# Patient Record
Sex: FEMALE | Race: WHITE | Employment: UNEMPLOYED | URBAN - METROPOLITAN AREA
[De-identification: names, ages, dates, MRNs, and addresses within clinical notes are randomized per-mention and may not be internally consistent; named-entity substitution may affect disease eponyms.]

---

## 2023-12-04 ENCOUNTER — OFFICE VISIT (OUTPATIENT)
Dept: FAMILY MEDICINE CLINIC | Facility: CLINIC | Age: 38
End: 2023-12-04
Payer: MEDICAID

## 2023-12-04 VITALS
DIASTOLIC BLOOD PRESSURE: 84 MMHG | BODY MASS INDEX: 45.67 KG/M2 | RESPIRATION RATE: 16 BRPM | HEIGHT: 63.25 IN | SYSTOLIC BLOOD PRESSURE: 136 MMHG | HEART RATE: 105 BPM | WEIGHT: 261 LBS | OXYGEN SATURATION: 98 %

## 2023-12-04 DIAGNOSIS — E78.2 MIXED HYPERLIPIDEMIA: ICD-10-CM

## 2023-12-04 DIAGNOSIS — E11.9 TYPE 2 DIABETES MELLITUS WITHOUT COMPLICATION, WITHOUT LONG-TERM CURRENT USE OF INSULIN (HCC): ICD-10-CM

## 2023-12-04 DIAGNOSIS — Z00.00 LABORATORY EXAMINATION ORDERED AS PART OF A ROUTINE GENERAL MEDICAL EXAMINATION: ICD-10-CM

## 2023-12-04 DIAGNOSIS — Z82.49 FAMILY HISTORY OF HEART DISEASE: ICD-10-CM

## 2023-12-04 DIAGNOSIS — K76.0 HEPATIC STEATOSIS: ICD-10-CM

## 2023-12-04 DIAGNOSIS — Z12.31 ENCOUNTER FOR SCREENING MAMMOGRAM FOR BREAST CANCER: ICD-10-CM

## 2023-12-04 DIAGNOSIS — G43.109 MIGRAINE WITH AURA AND WITHOUT STATUS MIGRAINOSUS, NOT INTRACTABLE: ICD-10-CM

## 2023-12-04 DIAGNOSIS — K42.9 UMBILICAL HERNIA WITHOUT OBSTRUCTION AND WITHOUT GANGRENE: Primary | ICD-10-CM

## 2023-12-04 DIAGNOSIS — Z23 NEED FOR VACCINATION: ICD-10-CM

## 2023-12-04 RX ORDER — ROSUVASTATIN CALCIUM 10 MG/1
10 TABLET, COATED ORAL NIGHTLY
Qty: 30 TABLET | Refills: 2 | Status: SHIPPED | OUTPATIENT
Start: 2023-12-04

## 2023-12-04 RX ORDER — BLOOD SUGAR DIAGNOSTIC
STRIP MISCELLANEOUS
COMMUNITY
Start: 2023-02-24

## 2023-12-04 RX ORDER — METFORMIN HYDROCHLORIDE 500 MG/1
1000 TABLET, EXTENDED RELEASE ORAL DAILY
COMMUNITY
Start: 2023-05-24

## 2023-12-04 RX ORDER — IBUPROFEN 600 MG/1
600 TABLET ORAL EVERY 8 HOURS PRN
COMMUNITY
Start: 2023-04-13

## 2023-12-04 RX ORDER — CITALOPRAM 40 MG/1
40 TABLET ORAL DAILY
COMMUNITY
Start: 2022-11-15

## 2023-12-04 RX ORDER — HYDROXYZINE HYDROCHLORIDE 25 MG/1
25 TABLET, FILM COATED ORAL 3 TIMES DAILY PRN
COMMUNITY

## 2023-12-04 RX ORDER — ATORVASTATIN CALCIUM 40 MG/1
40 TABLET, FILM COATED ORAL NIGHTLY
COMMUNITY
Start: 2023-05-23 | End: 2023-12-04

## 2023-12-04 RX ORDER — ALBUTEROL SULFATE 90 UG/1
2 AEROSOL, METERED RESPIRATORY (INHALATION) EVERY 4 HOURS PRN
COMMUNITY
Start: 2023-02-24

## 2023-12-04 RX ORDER — LANCETS
1 EACH MISCELLANEOUS AS NEEDED
COMMUNITY
Start: 2022-12-03

## 2023-12-04 RX ORDER — GALCANEZUMAB 120 MG/ML
240 INJECTION, SOLUTION SUBCUTANEOUS
Qty: 2 ML | Refills: 0 | Status: SHIPPED | OUTPATIENT
Start: 2023-12-04

## 2023-12-04 RX ORDER — BLOOD-GLUCOSE METER
1 EACH MISCELLANEOUS AS NEEDED
COMMUNITY
Start: 2022-12-03

## 2023-12-04 RX ORDER — GALCANEZUMAB 120 MG/ML
120 INJECTION, SOLUTION SUBCUTANEOUS
COMMUNITY
Start: 2023-01-16 | End: 2023-12-04

## 2023-12-05 ENCOUNTER — TELEPHONE (OUTPATIENT)
Dept: FAMILY MEDICINE CLINIC | Facility: CLINIC | Age: 38
End: 2023-12-05

## 2023-12-05 ENCOUNTER — LAB ENCOUNTER (OUTPATIENT)
Dept: LAB | Age: 38
End: 2023-12-05
Attending: FAMILY MEDICINE
Payer: MEDICAID

## 2023-12-05 DIAGNOSIS — Z00.00 LABORATORY EXAMINATION ORDERED AS PART OF A ROUTINE GENERAL MEDICAL EXAMINATION: ICD-10-CM

## 2023-12-05 DIAGNOSIS — E11.9 TYPE 2 DIABETES MELLITUS WITHOUT COMPLICATION, WITHOUT LONG-TERM CURRENT USE OF INSULIN (HCC): ICD-10-CM

## 2023-12-05 LAB
ALBUMIN SERPL-MCNC: 3.6 G/DL (ref 3.4–5)
ALBUMIN/GLOB SERPL: 0.9 {RATIO} (ref 1–2)
ALP LIVER SERPL-CCNC: 77 U/L
ALT SERPL-CCNC: 76 U/L
ANION GAP SERPL CALC-SCNC: 8 MMOL/L (ref 0–18)
AST SERPL-CCNC: 53 U/L (ref 15–37)
BASOPHILS # BLD AUTO: 0.07 X10(3) UL (ref 0–0.2)
BASOPHILS NFR BLD AUTO: 0.8 %
BILIRUB SERPL-MCNC: 0.6 MG/DL (ref 0.1–2)
BUN BLD-MCNC: 14 MG/DL (ref 9–23)
CALCIUM BLD-MCNC: 9.3 MG/DL (ref 8.5–10.1)
CHLORIDE SERPL-SCNC: 103 MMOL/L (ref 98–112)
CHOLEST SERPL-MCNC: 275 MG/DL (ref ?–200)
CO2 SERPL-SCNC: 28 MMOL/L (ref 21–32)
CREAT BLD-MCNC: 0.69 MG/DL
CREAT UR-SCNC: 172 MG/DL
EGFRCR SERPLBLD CKD-EPI 2021: 112 ML/MIN/1.73M2 (ref 60–?)
EOSINOPHIL # BLD AUTO: 0.68 X10(3) UL (ref 0–0.7)
EOSINOPHIL NFR BLD AUTO: 8.2 %
ERYTHROCYTE [DISTWIDTH] IN BLOOD BY AUTOMATED COUNT: 12.5 %
FASTING PATIENT LIPID ANSWER: YES
FASTING STATUS PATIENT QL REPORTED: YES
GLOBULIN PLAS-MCNC: 4.2 G/DL (ref 2.8–4.4)
GLUCOSE BLD-MCNC: 122 MG/DL (ref 70–99)
HCT VFR BLD AUTO: 44.6 %
HDLC SERPL-MCNC: 62 MG/DL (ref 40–59)
HGB BLD-MCNC: 14.5 G/DL
IMM GRANULOCYTES # BLD AUTO: 0.02 X10(3) UL (ref 0–1)
IMM GRANULOCYTES NFR BLD: 0.2 %
LDLC SERPL CALC-MCNC: 189 MG/DL (ref ?–100)
LYMPHOCYTES # BLD AUTO: 2.39 X10(3) UL (ref 1–4)
LYMPHOCYTES NFR BLD AUTO: 28.8 %
MCH RBC QN AUTO: 27.7 PG (ref 26–34)
MCHC RBC AUTO-ENTMCNC: 32.5 G/DL (ref 31–37)
MCV RBC AUTO: 85.1 FL
MICROALBUMIN UR-MCNC: 1.42 MG/DL
MICROALBUMIN/CREAT 24H UR-RTO: 8.3 UG/MG (ref ?–30)
MONOCYTES # BLD AUTO: 0.74 X10(3) UL (ref 0.1–1)
MONOCYTES NFR BLD AUTO: 8.9 %
NEUTROPHILS # BLD AUTO: 4.4 X10 (3) UL (ref 1.5–7.7)
NEUTROPHILS # BLD AUTO: 4.4 X10(3) UL (ref 1.5–7.7)
NEUTROPHILS NFR BLD AUTO: 53.1 %
NONHDLC SERPL-MCNC: 213 MG/DL (ref ?–130)
OSMOLALITY SERPL CALC.SUM OF ELEC: 290 MOSM/KG (ref 275–295)
PLATELET # BLD AUTO: 180 10(3)UL (ref 150–450)
POTASSIUM SERPL-SCNC: 4.7 MMOL/L (ref 3.5–5.1)
PROT SERPL-MCNC: 7.8 G/DL (ref 6.4–8.2)
RBC # BLD AUTO: 5.24 X10(6)UL
SODIUM SERPL-SCNC: 139 MMOL/L (ref 136–145)
TRIGL SERPL-MCNC: 136 MG/DL (ref 30–149)
TSI SER-ACNC: 2.77 MIU/ML (ref 0.36–3.74)
VLDLC SERPL CALC-MCNC: 28 MG/DL (ref 0–30)
WBC # BLD AUTO: 8.3 X10(3) UL (ref 4–11)

## 2023-12-05 PROCEDURE — 84443 ASSAY THYROID STIM HORMONE: CPT

## 2023-12-05 PROCEDURE — 83036 HEMOGLOBIN GLYCOSYLATED A1C: CPT

## 2023-12-05 PROCEDURE — 85025 COMPLETE CBC W/AUTO DIFF WBC: CPT

## 2023-12-05 PROCEDURE — 82043 UR ALBUMIN QUANTITATIVE: CPT

## 2023-12-05 PROCEDURE — 80053 COMPREHEN METABOLIC PANEL: CPT

## 2023-12-05 PROCEDURE — 80061 LIPID PANEL: CPT

## 2023-12-05 PROCEDURE — 82570 ASSAY OF URINE CREATININE: CPT

## 2023-12-05 PROCEDURE — 36415 COLL VENOUS BLD VENIPUNCTURE: CPT

## 2023-12-07 LAB — HGBA1C: 7.1 %

## 2023-12-11 ENCOUNTER — OFFICE VISIT (OUTPATIENT)
Facility: LOCATION | Age: 38
End: 2023-12-11
Payer: MEDICAID

## 2023-12-11 VITALS — HEART RATE: 100 BPM | TEMPERATURE: 97 F

## 2023-12-11 DIAGNOSIS — K42.9 UMBILICAL HERNIA WITHOUT OBSTRUCTION AND WITHOUT GANGRENE: Primary | ICD-10-CM

## 2023-12-13 DIAGNOSIS — R79.89 ELEVATED LFTS: Primary | ICD-10-CM

## 2023-12-13 DIAGNOSIS — E78.00 ELEVATED LDL CHOLESTEROL LEVEL: ICD-10-CM

## 2023-12-13 DIAGNOSIS — E11.9 TYPE 2 DIABETES MELLITUS WITHOUT COMPLICATION, WITHOUT LONG-TERM CURRENT USE OF INSULIN (HCC): ICD-10-CM

## 2023-12-28 ENCOUNTER — TELEPHONE (OUTPATIENT)
Dept: FAMILY MEDICINE CLINIC | Facility: CLINIC | Age: 38
End: 2023-12-28

## 2023-12-28 NOTE — TELEPHONE ENCOUNTER
Surgery 1/24/24  Surgeon Adilene Multani MD   Location [place] [address] sheet+ pre-op orders routed to provider.

## 2023-12-30 ENCOUNTER — HOSPITAL ENCOUNTER (OUTPATIENT)
Dept: CT IMAGING | Age: 38
Discharge: HOME | End: 2023-12-30
Attending: FAMILY MEDICINE

## 2023-12-30 DIAGNOSIS — Z82.49 FAMILY HISTORY OF HEART DISEASE: ICD-10-CM

## 2024-01-02 ENCOUNTER — HOSPITAL ENCOUNTER (OUTPATIENT)
Dept: MAMMOGRAPHY | Age: 39
Discharge: HOME | End: 2024-01-02
Attending: FAMILY MEDICINE
Payer: MEDICAID

## 2024-01-02 DIAGNOSIS — Z12.31 ENCOUNTER FOR SCREENING MAMMOGRAM FOR BREAST CANCER: ICD-10-CM

## 2024-01-02 PROCEDURE — 77063 BREAST TOMOSYNTHESIS BI: CPT | Performed by: FAMILY MEDICINE

## 2024-01-02 PROCEDURE — 77067 SCR MAMMO BI INCL CAD: CPT | Performed by: FAMILY MEDICINE

## 2024-01-03 ENCOUNTER — TELEPHONE (OUTPATIENT)
Facility: LOCATION | Age: 39
End: 2024-01-03

## 2024-01-03 ENCOUNTER — OFFICE VISIT (OUTPATIENT)
Dept: FAMILY MEDICINE CLINIC | Facility: CLINIC | Age: 39
End: 2024-01-03
Payer: MEDICAID

## 2024-01-03 ENCOUNTER — TELEPHONE (OUTPATIENT)
Dept: FAMILY MEDICINE CLINIC | Facility: CLINIC | Age: 39
End: 2024-01-03

## 2024-01-03 VITALS
HEART RATE: 85 BPM | SYSTOLIC BLOOD PRESSURE: 136 MMHG | OXYGEN SATURATION: 96 % | RESPIRATION RATE: 16 BRPM | WEIGHT: 264 LBS | DIASTOLIC BLOOD PRESSURE: 84 MMHG | HEIGHT: 63 IN | BODY MASS INDEX: 46.78 KG/M2

## 2024-01-03 DIAGNOSIS — Z01.818 PRE-OP TESTING: ICD-10-CM

## 2024-01-03 DIAGNOSIS — G43.109 MIGRAINE WITH AURA AND WITHOUT STATUS MIGRAINOSUS, NOT INTRACTABLE: ICD-10-CM

## 2024-01-03 DIAGNOSIS — K42.9 UMBILICAL HERNIA WITHOUT OBSTRUCTION AND WITHOUT GANGRENE: Primary | ICD-10-CM

## 2024-01-03 DIAGNOSIS — E11.9 TYPE 2 DIABETES MELLITUS WITHOUT COMPLICATION, WITHOUT LONG-TERM CURRENT USE OF INSULIN (HCC): ICD-10-CM

## 2024-01-03 DIAGNOSIS — E78.2 MIXED HYPERLIPIDEMIA: ICD-10-CM

## 2024-01-03 DIAGNOSIS — F33.42 RECURRENT MAJOR DEPRESSIVE DISORDER, IN FULL REMISSION (HCC): ICD-10-CM

## 2024-01-03 PROCEDURE — 93000 ELECTROCARDIOGRAM COMPLETE: CPT | Performed by: FAMILY MEDICINE

## 2024-01-03 PROCEDURE — 3008F BODY MASS INDEX DOCD: CPT | Performed by: FAMILY MEDICINE

## 2024-01-03 PROCEDURE — 3079F DIAST BP 80-89 MM HG: CPT | Performed by: FAMILY MEDICINE

## 2024-01-03 PROCEDURE — 3075F SYST BP GE 130 - 139MM HG: CPT | Performed by: FAMILY MEDICINE

## 2024-01-03 PROCEDURE — 99214 OFFICE O/P EST MOD 30 MIN: CPT | Performed by: FAMILY MEDICINE

## 2024-01-03 RX ORDER — CITALOPRAM 40 MG/1
40 TABLET ORAL DAILY
Qty: 90 TABLET | Refills: 1 | Status: SHIPPED | OUTPATIENT
Start: 2024-01-03

## 2024-01-03 NOTE — PROGRESS NOTES
[facility] Family Medicine Office Note  Chief Complaint:   Chief Complaint   Patient presents with    Follow - Up       HPI:   This is a 38 year old female coming in for follow up/preop.     Umbilical hernia - robotic laparoscopic ventral hernia repair with mesh scheduled for . Has had anesthesia in the past, no complications. Denies any new sx regarding her hernia.     T2DM - showed acceptable control with A1c at 7.1%. Has been compliant with metformin.     Depression -s table on citalopram    Migraines - has not followed up with neurology yet. Emgality not approved by insurance.     Hyperlipidemia/FLD - tolerating crestor well w/o side effects. Heart scan was normal    Past Medical History:   Diagnosis Date    Anxiety 5yrs    Arthritis     left hand due to a break 20 yrs ago    Depression 15 yrs    Diabetes (HCC) 20    Disorder of liver     FATTY LIVER    Hyperlipidemia 5 yrs    fatty liver also    Migraines     Obesity always     Past Surgical History:   Procedure Laterality Date      2009    HYSTERECTOMY  2022    ovaries remain, everything else removed     Social History:  Social History     Socioeconomic History    Marital status: Single   Tobacco Use    Smoking status: Former     Packs/day: 1.00     Years: 19.00     Additional pack years: 0.00     Total pack years: 19.00     Types: Cigarettes     Quit date: 2019     Years since quittin.6    Smokeless tobacco: Never   Vaping Use    Vaping Use: Former    Substances: Nicotine    Devices: Disposable   Substance and Sexual Activity    Alcohol use: Yes     Comment: possibly one or two drinks per month    Drug use: Never   Other Topics Concern    Caffeine Concern Yes     Comment: drink energy drinks 4-5x per week    Exercise Yes     Comment: limited due to knee pain and abdominal pain    Seat Belt No    Special Diet No    Stress Concern Yes    Weight Concern Yes     Family History:  Family History   Problem Relation  Age of Onset    Diabetes Mother         type 2    Heart Disorder Mother         quadruple bypass @ 40    Hypertension Mother     Lipids Mother     Pulmonary Disease Mother         copd and emphysema    Diabetes Father         type 2    Heart Disorder Father         mitral valve    Hypertension Father     Lipids Father     Pulmonary Disease Father         copd    Stroke Father     Diabetes Sister         type 2    Lipids Sister     Obesity Sister     Diabetes Maternal Grandmother         type 2    Cancer Paternal Grandmother         colon cancer    Diabetes Paternal Grandmother         type 2    Diabetes Daughter         type 1    Lipids Daughter      Allergies:  Allergies   Allergen Reactions    Black [place] Pollen Allergy Skin Test WHEEZING, Coughing and SHORTNESS OF BREATH     Current Meds:  Current Outpatient Medications   Medication Sig Dispense Refill    citalopram 40 MG Oral Tab Take 1 tablet (40 mg total) by mouth daily. 90 tablet 1    ubrogepant 100 MG Oral Tab Take 100 mg by mouth as needed.      metFORMIN  MG Oral Tablet 24 Hr Take 2 tablets (1,000 mg total) by mouth daily.      Glucose Blood (ONETOUCH ULTRA) In Vitro Strip Use 1-2 times daily to check blood sugar as directed.  Dx E11.9      Blood Glucose Monitoring Suppl (ONETOUCH VERIO FLEX SYSTEM) w/Device Does not apply Kit 1 each by Other route as needed.      OneTouch UltraSoft Lancets Does not apply Misc 1 each by Other route as needed.      albuterol 108 (90 Base) MCG/ACT Inhalation Aero Soln Inhale 2 puffs into the lungs every 4 (four) hours as needed for Wheezing or Shortness of Breath.      rosuvastatin 10 MG Oral Tab Take 1 tablet (10 mg total) by mouth nightly. 30 tablet 2    hydrOXYzine 25 MG Oral Tab Take 1 tablet (25 mg total) by mouth 3 (three) times daily as needed for Anxiety.        Counseling given: Not Answered       REVIEW OF SYSTEMS:   Review of Systems   Review of Systems   Constitutional: Negative.    Respiratory: Negative.      Cardiovascular: Negative.    Gastrointestinal:  Positive for abdominal pain. Negative for abdominal distention, blood in stool, constipation, diarrhea, nausea and vomiting.   Genitourinary: Negative.    Musculoskeletal: Negative.    Skin: Negative.    Neurological: Negative.       EXAM:   /84   Pulse 85   Resp 16   Ht 5' 3\" (1.6 m)   Wt 264 lb (119.7 kg)   SpO2 96%   BMI 46.77 kg/m²  Estimated body mass index is 46.77 kg/m² as calculated from the following:    Height as of this encounter: 5' 3\" (1.6 m).    Weight as of this encounter: 264 lb (119.7 kg).   Vital signs reviewed.Appears stated age, well groomed.  Physical Exam   Physical Exam  Vitals and nursing note reviewed.   Constitutional:       Appearance: Normal appearance. She is obese.   HENT:      Head: Normocephalic and atraumatic.   Eyes:      Pupils: Pupils are equal, round, and reactive to light.   Cardiovascular:      Rate and Rhythm: Normal rate and regular rhythm.      Pulses: Normal pulses.      Heart sounds: Normal heart sounds. No murmur heard.  Pulmonary:      Effort: Pulmonary effort is normal. No respiratory distress.      Breath sounds: Normal breath sounds. No wheezing or rhonchi.   Abdominal:      General: Abdomen is flat. Bowel sounds are normal. There is no distension.      Palpations: Abdomen is soft. There is no mass.      Tenderness: There is no abdominal tenderness.      Hernia: A hernia (umbilical hernia, reducible) is present.   Musculoskeletal:      Right lower leg: No edema.      Left lower leg: No edema.   Skin:     Capillary Refill: Capillary refill takes less than 2 seconds.      Findings: No rash.   Neurological:      General: No focal deficit present.      Mental Status: She is alert and oriented to person, place, and time.   Psychiatric:         Mood and Affect: Mood normal.     Bilateral barefoot skin diabetic exam is normal, visualized feet and the appearance is normal.  Bilateral monofilament/sensation of both  feet is normal.  Pulsation pedal pulse exam of both lower legs/feet is normal as well.      ASSESSMENT AND PLAN:   1. Umbilical hernia without obstruction and without gangrene  She has no cardiac conditions. Her DM is controlled. Asthma is controlled w/ prn albuterol. RCRI 0, class 1 risk. Functional capacity excellent, >4 METs. EKG NSR no acute ST segment changes, normal axis. She is low surgical risk, okay for surgery. This note was sent back to referring physician, Dr. Adame.     2. Pre-op testing  - EKG 12 Lead; Future    3. Type 2 diabetes mellitus without complication, without long-term current use of insulin (HCC)  Controlled, CPM. Diabetic foot exam wnl today. Advised to send us records for her eye exam. F/u 6mo.     4. Migraine with aura and without status migrainosus, not intractable  Stable, follow up with neuro.     5. Mixed hyperlipidemia  Tolerating crestor well. Reviewed heart scan findings. Continue low fat diet, regular exercise. Repeat lipid panel/LFTs in 3mo.     6. Recurrent major depressive disorder, in full remission (HCC)  Stable, CPM.   - citalopram 40 MG Oral Tab; Take 1 tablet (40 mg total) by mouth daily.  Dispense: 90 tablet; Refill: 1      Meds & Refills for this Visit:  Requested Prescriptions     Signed Prescriptions Disp Refills    citalopram 40 MG Oral Tab 90 tablet 1     Sig: Take 1 tablet (40 mg total) by mouth daily.       Health Maintenance:  Health Maintenance Due   Topic Date Due    Annual Physical  Never done    Diabetes Care Dilated Eye Exam  Never done    COVID-19 Vaccine (5 - 2023-24 season) 09/01/2023    Annual Depression Screening  Never done    Diabetes Care Foot Exam (Annual)  Never done       Patient/Caregiver Education: Patient/Caregiver Education: There are no barriers to learning. Medical education done.   Outcome: Patient verbalizes understanding. Patient is notified to call with any questions, complications, allergies, or worsening or changing symptoms.  Patient  is to call with any side effects or complications from the treatments as a result of today.     Problem List:  Patient Active Problem List   Diagnosis    Hepatic steatosis    Umbilical hernia without obstruction and without gangrene    Type 2 diabetes mellitus without complication, without long-term current use of insulin (HCC)    Family history of heart disease    Mixed hyperlipidemia    Migraine with aura and without status migrainosus, not intractable    Recurrent major depressive disorder, in full remission (HCC)

## 2024-01-03 NOTE — TELEPHONE ENCOUNTER
Pre-op H& P + EKG were faxed # [phone] to pre admission testing .Success confirmation was received.

## 2024-01-03 NOTE — TELEPHONE ENCOUNTER
Pre-op H&P and EKG were sent via fax # [phone] to preadmission testing. Success confirmation was received.

## 2024-01-04 LAB
ATRIAL RATE: 75 BPM
P AXIS: 32 DEGREES
P-R INTERVAL: 162 MS
Q-T INTERVAL: 408 MS
QRS DURATION: 92 MS
QTC CALCULATION (BEZET): 455 MS
R AXIS: 61 DEGREES
T AXIS: 53 DEGREES
VENTRICULAR RATE: 75 BPM

## 2024-01-15 ENCOUNTER — TELEPHONE (OUTPATIENT)
Facility: LOCATION | Age: 39
End: 2024-01-15

## 2024-01-15 ENCOUNTER — OFFICE VISIT (OUTPATIENT)
Dept: FAMILY MEDICINE CLINIC | Facility: CLINIC | Age: 39
End: 2024-01-15
Payer: MEDICAID

## 2024-01-15 ENCOUNTER — HOSPITAL ENCOUNTER (OUTPATIENT)
Dept: GENERAL RADIOLOGY | Age: 39
Discharge: HOME | End: 2024-01-15
Attending: FAMILY MEDICINE
Payer: MEDICAID

## 2024-01-15 VITALS
OXYGEN SATURATION: 96 % | BODY MASS INDEX: 47.48 KG/M2 | RESPIRATION RATE: 16 BRPM | WEIGHT: 268 LBS | SYSTOLIC BLOOD PRESSURE: 130 MMHG | HEIGHT: 63 IN | TEMPERATURE: 98 F | DIASTOLIC BLOOD PRESSURE: 70 MMHG | HEART RATE: 90 BPM

## 2024-01-15 DIAGNOSIS — M25.552 LEFT HIP PAIN: Primary | ICD-10-CM

## 2024-01-15 DIAGNOSIS — M25.552 LEFT HIP PAIN: ICD-10-CM

## 2024-01-15 DIAGNOSIS — M79.18 MYOFASCIAL PAIN: ICD-10-CM

## 2024-01-15 PROCEDURE — 99214 OFFICE O/P EST MOD 30 MIN: CPT | Performed by: FAMILY MEDICINE

## 2024-01-15 PROCEDURE — 3075F SYST BP GE 130 - 139MM HG: CPT | Performed by: FAMILY MEDICINE

## 2024-01-15 PROCEDURE — 73502 X-RAY EXAM HIP UNI 2-3 VIEWS: CPT | Performed by: FAMILY MEDICINE

## 2024-01-15 PROCEDURE — 3008F BODY MASS INDEX DOCD: CPT | Performed by: FAMILY MEDICINE

## 2024-01-15 PROCEDURE — 3078F DIAST BP <80 MM HG: CPT | Performed by: FAMILY MEDICINE

## 2024-01-15 RX ORDER — TRAMADOL HYDROCHLORIDE 50 MG/1
50 TABLET ORAL EVERY 8 HOURS PRN
Qty: 21 TABLET | Refills: 0 | Status: SHIPPED | OUTPATIENT
Start: 2024-01-15

## 2024-01-15 RX ORDER — CYCLOBENZAPRINE HCL 10 MG
10 TABLET ORAL 3 TIMES DAILY
Qty: 30 TABLET | Refills: 0 | Status: SHIPPED | OUTPATIENT
Start: 2024-01-15

## 2024-01-15 NOTE — TELEPHONE ENCOUNTER
Patient's pcp has her on 2 new meds: tramadol & cyclobenzaprine. She and he wants to confirm ok to take before 1/24 sx for ventral hernia w/Deep.    P: [phone]

## 2024-01-15 NOTE — TELEPHONE ENCOUNTER
Transaction ID: [id number] ID: [id number] Date: 2024-01-15  CITLALY HOPPER Patient  Member ID  [id number]    Date of Birth  1985-07-13    Gender  Female    Transaction Type  Outpatient Authorization    Organization  [facility]    Payer  [facility] logo     Certificate Information  Reference Number  [id number]    Status  NO ACTION REQUIRED    Message  Requested Service does not require preauthorization. We would strongly encourage you to check benefits for this service.    Member Information  Patient Name  CITLALY HOPPER    Patient Date of Birth  1985-07-13    Patient Gender  Female    Member ID  [id number]    Relationship to Subscriber  Self    Subscriber Name  CITLALY HOPPER    Requesting Provider     Name  DAMON COPELAND    NPI  [id number]    Tax Id  [id number]    Specialty  [id number]  Provider Role  Provider    Address  [address] [place] [address]    Phone  [phone]  Fax  [phone]    Contact Name  SHANEL ADAMS    Service Information  Service Type  2 - Surgical    Place of Service  22 - [facility]    Service From - To Date  2024-01-24 - 2024-02-28    Level of Service  Elective    Diagnosis Code 1  K429 - Umbilical hernia without obstruction or gangrene    Procedure Code 1 (CPT/HCPCS)  99127 - RPR AA HRN 1ST > 10 RDC    Quantity  1 Units    Status  NO ACTION REQUIRED    Rendering Provider/Facility     Provider 1  Name  DAMON COPELAND    NPI  [id number]    Specialty  [id number]  Provider Role  Attending    Address  [address] [place] [address]    Provider 2  Name  [facility]    NPI  [id number]    Provider Role  Facility    Address  [address] [place] [address]

## 2024-01-15 NOTE — PROGRESS NOTES
[facility] Family Medicine Office Note  Chief Complaint:   Chief Complaint   Patient presents with    Hip Pain     23, left side, sharp pain, pt states she feels a lump, dull ache pain when sitting        HPI:   This is a 38 year old female coming in for    L hip pain  -Ongoing since 23   -She reports that it occurs on L side, sharp pain. Noticed a lump in the area.   -Worse with walking, sitting.   -She denies any injury or trauma. Noticed it when she was walking in grocery store.   -Pain can get up to 9/10.   -No numbness/weakness.   -Pain can radiate down L lateral hip, L posterior hip.     Past Medical History:   Diagnosis Date    Anxiety 5yrs    Arthritis     left hand due to a break 20 yrs ago    Depression 15 yrs    Diabetes (HCC) 20    Disorder of liver     FATTY LIVER    Hyperlipidemia 5 yrs    fatty liver also    Migraines     Obesity always     Past Surgical History:   Procedure Laterality Date      2009    HYSTERECTOMY  2022    ovaries remain, everything else removed     Social History:  Social History     Socioeconomic History    Marital status: Single   Tobacco Use    Smoking status: Former     Packs/day: 1.00     Years: 19.00     Additional pack years: 0.00     Total pack years: 19.00     Types: Cigarettes     Quit date: 2019     Years since quittin.6    Smokeless tobacco: Never   Vaping Use    Vaping Use: Former    Substances: Nicotine    Devices: Disposable   Substance and Sexual Activity    Alcohol use: Yes     Comment: possibly one or two drinks per month    Drug use: Never   Other Topics Concern    Caffeine Concern Yes     Comment: drink energy drinks 4-5x per week    Exercise Yes     Comment: limited due to knee pain and abdominal pain    Seat Belt No    Special Diet No    Stress Concern Yes    Weight Concern Yes     Family History:  Family History   Problem Relation Age of Onset    Diabetes Mother         type 2    Heart Disorder Mother          quadruple bypass @ 40    Hypertension Mother     Lipids Mother     Pulmonary Disease Mother         copd and emphysema    Diabetes Father         type 2    Heart Disorder Father         mitral valve    Hypertension Father     Lipids Father     Pulmonary Disease Father         copd    Stroke Father     Diabetes Sister         type 2    Lipids Sister     Obesity Sister     Diabetes Maternal Grandmother         type 2    Cancer Paternal Grandmother         colon cancer    Diabetes Paternal Grandmother         type 2    Diabetes Daughter         type 1    Lipids Daughter      Allergies:  Allergies   Allergen Reactions    Black [place] Pollen Allergy Skin Test WHEEZING, Coughing and SHORTNESS OF BREATH     Current Meds:  Current Outpatient Medications   Medication Sig Dispense Refill    citalopram 40 MG Oral Tab Take 1 tablet (40 mg total) by mouth daily. 90 tablet 1    ubrogepant 100 MG Oral Tab Take 100 mg by mouth as needed.      metFORMIN  MG Oral Tablet 24 Hr Take 2 tablets (1,000 mg total) by mouth daily.      Glucose Blood (ONETOUCH ULTRA) In Vitro Strip Use 1-2 times daily to check blood sugar as directed.  Dx E11.9      Blood Glucose Monitoring Suppl (ONETOUCH VERIO FLEX SYSTEM) w/Device Does not apply Kit 1 each by Other route as needed.      OneTouch UltraSoft Lancets Does not apply Misc 1 each by Other route as needed.      albuterol 108 (90 Base) MCG/ACT Inhalation Aero Soln Inhale 2 puffs into the lungs every 4 (four) hours as needed for Wheezing or Shortness of Breath.      rosuvastatin 10 MG Oral Tab Take 1 tablet (10 mg total) by mouth nightly. 30 tablet 2    hydrOXYzine 25 MG Oral Tab Take 1 tablet (25 mg total) by mouth 3 (three) times daily as needed for Anxiety.        Counseling given: Not Answered       REVIEW OF SYSTEMS:   Review of Systems   A comprehensive 10 point review of systems was completed.  Pertinent positives and negatives noted in the the HPI.    EXAM:   /70   Pulse  90   Temp 98 °F (36.7 °C)   Resp 16   Ht 5' 3\" (1.6 m)   Wt 268 lb (121.6 kg)   SpO2 96%   BMI 47.47 kg/m²  Estimated body mass index is 47.47 kg/m² as calculated from the following:    Height as of this encounter: 5' 3\" (1.6 m).    Weight as of this encounter: 268 lb (121.6 kg).   Vital signs reviewed.Appears stated age, well groomed.  Physical Exam  Vitals and nursing note reviewed.   Constitutional:       Appearance: Normal appearance.   HENT:      Head: Normocephalic and atraumatic.   Pulmonary:      Effort: Pulmonary effort is normal.   Musculoskeletal:      Left hip: Deformity (myofascial trigger point tender to palpation at L lateral hip) and tenderness present. No bony tenderness. Decreased range of motion (decreased internal rotation).   Skin:     Findings: No rash.   Neurological:      Mental Status: She is alert and oriented to person, place, and time.   Psychiatric:         Mood and Affect: Mood normal.          ASSESSMENT AND PLAN:   1. Left hip pain  Suspect L hip strain, myofascial pain. Palpable myofascial trigger point. NSAIDs c/I currently d/t surgery next week. She has been maximizing tylenol w/o much relief. Will add on flexeril. Advised on topical lidocaine. Can use tramadol as needed for severe sx, refractory pain - reviewed medication administration, risks. Consider kenalog injection if no improvement in symptoms. Advised on daily stretching exercises. Consider PT if no improvement. F/u 2wks.   - XR HIP + PELVIS MIN 4 VIEWS LEFT (CPT=73503); Future    2. Myofascial pain  - cyclobenzaprine 10 MG Oral Tab; Take 1 tablet (10 mg total) by mouth 3 (three) times daily.  Dispense: 30 tablet; Refill: 0  - traMADol 50 MG Oral Tab; Take 1 tablet (50 mg total) by mouth every 8 (eight) hours as needed for Pain.  Dispense: 21 tablet; Refill: 0      Meds & Refills for this Visit:  Requested Prescriptions      No prescriptions requested or ordered in this encounter       Health Maintenance:  Health  Maintenance Due   Topic Date Due    Annual Physical  Never done    Diabetes Care Dilated Eye Exam  Never done    COVID-19 Vaccine (5 - 2023-24 season) 09/01/2023    Annual Depression Screening  Never done       Patient/Caregiver Education: Patient/Caregiver Education: There are no barriers to learning. Medical education done.   Outcome: Patient verbalizes understanding. Patient is notified to call with any questions, complications, allergies, or worsening or changing symptoms.  Patient is to call with any side effects or complications from the treatments as a result of today.     Problem List:  Patient Active Problem List   Diagnosis    Hepatic steatosis    Umbilical hernia without obstruction and without gangrene    Type 2 diabetes mellitus without complication, without long-term current use of insulin (HCC)    Family history of heart disease    Mixed hyperlipidemia    Migraine with aura and without status migrainosus, not intractable    Recurrent major depressive disorder, in full remission (HCC)

## 2024-01-16 ENCOUNTER — HOSPITAL ENCOUNTER (OUTPATIENT)
Dept: MAMMOGRAPHY | Age: 39
Discharge: HOME | End: 2024-01-16
Attending: FAMILY MEDICINE
Payer: MEDICAID

## 2024-01-16 ENCOUNTER — HOSPITAL ENCOUNTER (OUTPATIENT)
Dept: ULTRASOUND IMAGING | Age: 39
Discharge: HOME | End: 2024-01-16
Attending: FAMILY MEDICINE
Payer: MEDICAID

## 2024-01-16 DIAGNOSIS — R92.2 INCONCLUSIVE MAMMOGRAM: ICD-10-CM

## 2024-01-16 DIAGNOSIS — Z01.818 PRE-OP TESTING: ICD-10-CM

## 2024-01-16 PROCEDURE — 76642 ULTRASOUND BREAST LIMITED: CPT | Performed by: FAMILY MEDICINE

## 2024-01-16 PROCEDURE — 77065 DX MAMMO INCL CAD UNI: CPT | Performed by: FAMILY MEDICINE

## 2024-01-16 PROCEDURE — 77061 BREAST TOMOSYNTHESIS UNI: CPT | Performed by: FAMILY MEDICINE

## 2024-01-17 DIAGNOSIS — M25.552 LEFT HIP PAIN: Primary | ICD-10-CM

## 2024-01-18 ENCOUNTER — TELEPHONE (OUTPATIENT)
Facility: LOCATION | Age: 39
End: 2024-01-18

## 2024-01-18 NOTE — TELEPHONE ENCOUNTER
Patient called to say that she developed a fever , congestion, sore throat at midnight.  Will take Covid test. Sx is for 1/24 w/Sugrue for ventral hernia.  Wants to know what else can take besides tylenol. Also does she need to reschedule sx?    P: [phone]

## 2024-01-19 ENCOUNTER — TELEPHONE (OUTPATIENT)
Facility: LOCATION | Age: 39
End: 2024-01-19

## 2024-01-19 DIAGNOSIS — K42.9 UMBILICAL HERNIA WITHOUT OBSTRUCTION AND WITHOUT GANGRENE: Primary | ICD-10-CM

## 2024-02-19 ENCOUNTER — TELEPHONE (OUTPATIENT)
Facility: LOCATION | Age: 39
End: 2024-02-19

## 2024-02-19 ENCOUNTER — ANESTHESIA EVENT (OUTPATIENT)
Dept: SURGERY | Facility: HOSPITAL | Age: 39
End: 2024-02-19
Payer: MEDICAID

## 2024-02-19 NOTE — TELEPHONE ENCOUNTER
CITLALY HOPPER Patient  Member ID  [id number]    Date of Birth  1985-07-13    Gender  Female    Transaction Type  Outpatient Authorization    Organization  [facility]    Payer  [facility] logo     Certificate Information  Reference Number  [id number]    Status  NO ACTION REQUIRED    Message  Requested Service does not require preauthorization. We would strongly encourage you to check benefits for this service.    Member Information  Patient Name  CITLALY HOPPER    Patient Date of Birth  1985-07-13    Patient Gender  Female    Member ID  [id number]    Relationship to Subscriber  Self    Subscriber Name  CITLALY HOPPER    Requesting Provider     Name  DAMON COPELNAD    NPI  [id number]    Tax Id  [id number]    Specialty  [id number]  Provider Role  Provider    Address  [address] [place] [address]    Phone  [phone]  Fax  [phone]    Contact Name  SHANEL ADAMS    Service Information  Service Type  2 - Surgical    Place of Service  22 - [facility]    Service From - To Date  2024-03-06 - 2024-05-29    Level of Service  Elective    Diagnosis Code 1  K429 - Umbilical hernia without obstruction or gangrene    Procedure Code 1 (CPT/HCPCS)  80150 - RPR AA HRN 1ST > 10 RDC    Quantity  1 Units    Status  NO ACTION REQUIRED    Rendering Provider/Facility     Provider 1  Name  DAMON COPELAND    NPI  [id number]    Specialty  [id number]  Provider Role  Attending    Address  [address] [place] [address]    Provider 2  Name  [facility]    NPI  [id number]    Provider Role  Facility    Address  [address] [place] [address]

## 2024-03-06 ENCOUNTER — HOSPITAL ENCOUNTER (OUTPATIENT)
Facility: HOSPITAL | Age: 39
Setting detail: HOSPITAL OUTPATIENT SURGERY
Discharge: HOME | End: 2024-03-06
Attending: STUDENT IN AN ORGANIZED HEALTH CARE EDUCATION/TRAINING PROGRAM | Admitting: STUDENT IN AN ORGANIZED HEALTH CARE EDUCATION/TRAINING PROGRAM
Payer: MEDICAID

## 2024-03-06 ENCOUNTER — ANESTHESIA (OUTPATIENT)
Dept: SURGERY | Facility: HOSPITAL | Age: 39
End: 2024-03-06
Payer: MEDICAID

## 2024-03-06 VITALS
OXYGEN SATURATION: 93 % | SYSTOLIC BLOOD PRESSURE: 97 MMHG | RESPIRATION RATE: 16 BRPM | WEIGHT: 261 LBS | HEIGHT: 63 IN | BODY MASS INDEX: 46.25 KG/M2 | HEART RATE: 102 BPM | DIASTOLIC BLOOD PRESSURE: 55 MMHG | TEMPERATURE: 99 F

## 2024-03-06 DIAGNOSIS — K42.9 UMBILICAL HERNIA WITHOUT OBSTRUCTION AND WITHOUT GANGRENE: Primary | ICD-10-CM

## 2024-03-06 LAB
GLUCOSE BLD-MCNC: 171 MG/DL (ref 70–99)
GLUCOSE BLD-MCNC: 263 MG/DL (ref 70–99)

## 2024-03-06 PROCEDURE — 49593 RPR AA HRN 1ST 3-10 RDC: CPT | Performed by: PHYSICIAN ASSISTANT

## 2024-03-06 PROCEDURE — 49593 RPR AA HRN 1ST 3-10 RDC: CPT | Performed by: STUDENT IN AN ORGANIZED HEALTH CARE EDUCATION/TRAINING PROGRAM

## 2024-03-06 PROCEDURE — 0WUF4JZ SUPPLEMENT ABDOMINAL WALL WITH SYNTHETIC SUBSTITUTE, PERCUTANEOUS ENDOSCOPIC APPROACH: ICD-10-PCS | Performed by: STUDENT IN AN ORGANIZED HEALTH CARE EDUCATION/TRAINING PROGRAM

## 2024-03-06 PROCEDURE — 76942 ECHO GUIDE FOR BIOPSY: CPT | Performed by: NURSE ANESTHETIST, CERTIFIED REGISTERED

## 2024-03-06 PROCEDURE — 76942 ECHO GUIDE FOR BIOPSY: CPT | Performed by: ANESTHESIOLOGY

## 2024-03-06 PROCEDURE — 8E0W4CZ ROBOTIC ASSISTED PROCEDURE OF TRUNK REGION, PERCUTANEOUS ENDOSCOPIC APPROACH: ICD-10-PCS | Performed by: STUDENT IN AN ORGANIZED HEALTH CARE EDUCATION/TRAINING PROGRAM

## 2024-03-06 DEVICE — GORE SYNECOR PREPERITONEAL 9CM CIRCULAR BIOMATERIAL
Type: IMPLANTABLE DEVICE | Site: ABDOMEN | Status: FUNCTIONAL
Brand: GORE SYNECOR PREPERITONEAL BIOMATERIAL

## 2024-03-06 RX ORDER — KETAMINE HYDROCHLORIDE 50 MG/ML
INJECTION, SOLUTION INTRAMUSCULAR; INTRAVENOUS AS NEEDED
Status: DISCONTINUED | OUTPATIENT
Start: 2024-03-06 | End: 2024-03-06 | Stop reason: SURG

## 2024-03-06 RX ORDER — DEXAMETHASONE SODIUM PHOSPHATE 10 MG/ML
INJECTION, SOLUTION INTRAMUSCULAR; INTRAVENOUS AS NEEDED
Status: DISCONTINUED | OUTPATIENT
Start: 2024-03-06 | End: 2024-03-06 | Stop reason: SURG

## 2024-03-06 RX ORDER — HYDROMORPHONE HYDROCHLORIDE 1 MG/ML
0.4 INJECTION, SOLUTION INTRAMUSCULAR; INTRAVENOUS; SUBCUTANEOUS EVERY 5 MIN PRN
Status: DISCONTINUED | OUTPATIENT
Start: 2024-03-06 | End: 2024-03-06

## 2024-03-06 RX ORDER — OXYCODONE HYDROCHLORIDE 5 MG/1
10 TABLET ORAL ONCE AS NEEDED
Status: COMPLETED | OUTPATIENT
Start: 2024-03-06 | End: 2024-03-06

## 2024-03-06 RX ORDER — OXYCODONE HYDROCHLORIDE 5 MG/1
5 TABLET ORAL ONCE AS NEEDED
Status: COMPLETED | OUTPATIENT
Start: 2024-03-06 | End: 2024-03-06

## 2024-03-06 RX ORDER — MEPERIDINE HYDROCHLORIDE 25 MG/ML
12.5 INJECTION INTRAMUSCULAR; INTRAVENOUS; SUBCUTANEOUS AS NEEDED
Status: DISCONTINUED | OUTPATIENT
Start: 2024-03-06 | End: 2024-03-06

## 2024-03-06 RX ORDER — ONDANSETRON 2 MG/ML
4 INJECTION INTRAMUSCULAR; INTRAVENOUS EVERY 6 HOURS PRN
Status: DISCONTINUED | OUTPATIENT
Start: 2024-03-06 | End: 2024-03-06

## 2024-03-06 RX ORDER — HYDROMORPHONE HYDROCHLORIDE 1 MG/ML
0.6 INJECTION, SOLUTION INTRAMUSCULAR; INTRAVENOUS; SUBCUTANEOUS EVERY 5 MIN PRN
Status: DISCONTINUED | OUTPATIENT
Start: 2024-03-06 | End: 2024-03-06

## 2024-03-06 RX ORDER — NALOXONE HYDROCHLORIDE 0.4 MG/ML
0.08 INJECTION, SOLUTION INTRAMUSCULAR; INTRAVENOUS; SUBCUTANEOUS AS NEEDED
Status: DISCONTINUED | OUTPATIENT
Start: 2024-03-06 | End: 2024-03-06

## 2024-03-06 RX ORDER — HYDROMORPHONE HYDROCHLORIDE 1 MG/ML
0.2 INJECTION, SOLUTION INTRAMUSCULAR; INTRAVENOUS; SUBCUTANEOUS EVERY 5 MIN PRN
Status: DISCONTINUED | OUTPATIENT
Start: 2024-03-06 | End: 2024-03-06

## 2024-03-06 RX ORDER — NICOTINE POLACRILEX 4 MG
15 LOZENGE BUCCAL
Status: DISCONTINUED | OUTPATIENT
Start: 2024-03-06 | End: 2024-03-06 | Stop reason: HOSPADM

## 2024-03-06 RX ORDER — NEOSTIGMINE METHYLSULFATE 1 MG/ML
INJECTION, SOLUTION INTRAVENOUS AS NEEDED
Status: DISCONTINUED | OUTPATIENT
Start: 2024-03-06 | End: 2024-03-06 | Stop reason: SURG

## 2024-03-06 RX ORDER — CEFAZOLIN SODIUM/WATER 2 G/20 ML
2 SYRINGE (ML) INTRAVENOUS ONCE
Status: COMPLETED | OUTPATIENT
Start: 2024-03-06 | End: 2024-03-06

## 2024-03-06 RX ORDER — INSULIN ASPART 100 [IU]/ML
INJECTION, SOLUTION INTRAVENOUS; SUBCUTANEOUS ONCE
Status: COMPLETED | OUTPATIENT
Start: 2024-03-06 | End: 2024-03-06

## 2024-03-06 RX ORDER — DEXAMETHASONE SODIUM PHOSPHATE 4 MG/ML
VIAL (ML) INJECTION AS NEEDED
Status: DISCONTINUED | OUTPATIENT
Start: 2024-03-06 | End: 2024-03-06 | Stop reason: SURG

## 2024-03-06 RX ORDER — NICOTINE POLACRILEX 4 MG
30 LOZENGE BUCCAL
Status: DISCONTINUED | OUTPATIENT
Start: 2024-03-06 | End: 2024-03-06 | Stop reason: HOSPADM

## 2024-03-06 RX ORDER — TRAMADOL HYDROCHLORIDE 50 MG/1
50 TABLET ORAL EVERY 6 HOURS PRN
Qty: 15 TABLET | Refills: 0 | Status: SHIPPED | OUTPATIENT
Start: 2024-03-06

## 2024-03-06 RX ORDER — INSULIN ASPART 100 [IU]/ML
INJECTION, SOLUTION INTRAVENOUS; SUBCUTANEOUS
Status: COMPLETED
Start: 2024-03-06 | End: 2024-03-06

## 2024-03-06 RX ORDER — DEXTROSE MONOHYDRATE 25 G/50ML
50 INJECTION, SOLUTION INTRAVENOUS
Status: DISCONTINUED | OUTPATIENT
Start: 2024-03-06 | End: 2024-03-06 | Stop reason: HOSPADM

## 2024-03-06 RX ORDER — BUPIVACAINE HYDROCHLORIDE 2.5 MG/ML
INJECTION, SOLUTION EPIDURAL; INFILTRATION; INTRACAUDAL AS NEEDED
Status: DISCONTINUED | OUTPATIENT
Start: 2024-03-06 | End: 2024-03-06 | Stop reason: HOSPADM

## 2024-03-06 RX ORDER — HEPARIN SODIUM 5000 [USP'U]/ML
5000 INJECTION, SOLUTION INTRAVENOUS; SUBCUTANEOUS ONCE
Status: COMPLETED | OUTPATIENT
Start: 2024-03-06 | End: 2024-03-06

## 2024-03-06 RX ORDER — MIDAZOLAM HYDROCHLORIDE 1 MG/ML
1 INJECTION INTRAMUSCULAR; INTRAVENOUS EVERY 5 MIN PRN
Status: DISCONTINUED | OUTPATIENT
Start: 2024-03-06 | End: 2024-03-06

## 2024-03-06 RX ORDER — ROCURONIUM BROMIDE 10 MG/ML
INJECTION, SOLUTION INTRAVENOUS AS NEEDED
Status: DISCONTINUED | OUTPATIENT
Start: 2024-03-06 | End: 2024-03-06 | Stop reason: SURG

## 2024-03-06 RX ORDER — DIPHENHYDRAMINE HYDROCHLORIDE 50 MG/ML
12.5 INJECTION INTRAMUSCULAR; INTRAVENOUS AS NEEDED
Status: DISCONTINUED | OUTPATIENT
Start: 2024-03-06 | End: 2024-03-06

## 2024-03-06 RX ORDER — KETOROLAC TROMETHAMINE 30 MG/ML
INJECTION, SOLUTION INTRAMUSCULAR; INTRAVENOUS AS NEEDED
Status: DISCONTINUED | OUTPATIENT
Start: 2024-03-06 | End: 2024-03-06 | Stop reason: SURG

## 2024-03-06 RX ORDER — PROCHLORPERAZINE EDISYLATE 5 MG/ML
5 INJECTION INTRAMUSCULAR; INTRAVENOUS EVERY 8 HOURS PRN
Status: DISCONTINUED | OUTPATIENT
Start: 2024-03-06 | End: 2024-03-06

## 2024-03-06 RX ORDER — ACETAMINOPHEN 500 MG
1000 TABLET ORAL ONCE
Status: DISCONTINUED | OUTPATIENT
Start: 2024-03-06 | End: 2024-03-06 | Stop reason: HOSPADM

## 2024-03-06 RX ORDER — GLYCOPYRROLATE 0.2 MG/ML
INJECTION, SOLUTION INTRAMUSCULAR; INTRAVENOUS AS NEEDED
Status: DISCONTINUED | OUTPATIENT
Start: 2024-03-06 | End: 2024-03-06 | Stop reason: SURG

## 2024-03-06 RX ORDER — LIDOCAINE HYDROCHLORIDE 10 MG/ML
INJECTION, SOLUTION EPIDURAL; INFILTRATION; INTRACAUDAL; PERINEURAL AS NEEDED
Status: DISCONTINUED | OUTPATIENT
Start: 2024-03-06 | End: 2024-03-06 | Stop reason: SURG

## 2024-03-06 RX ORDER — CEFAZOLIN SODIUM/WATER 2 G/20 ML
SYRINGE (ML) INTRAVENOUS
Status: DISCONTINUED
Start: 2024-03-06 | End: 2024-03-06

## 2024-03-06 RX ORDER — ONDANSETRON 2 MG/ML
INJECTION INTRAMUSCULAR; INTRAVENOUS AS NEEDED
Status: DISCONTINUED | OUTPATIENT
Start: 2024-03-06 | End: 2024-03-06 | Stop reason: SURG

## 2024-03-06 RX ORDER — HEPARIN SODIUM 5000 [USP'U]/ML
INJECTION, SOLUTION INTRAVENOUS; SUBCUTANEOUS
Status: COMPLETED
Start: 2024-03-06 | End: 2024-03-06

## 2024-03-06 RX ORDER — SODIUM CHLORIDE, SODIUM LACTATE, POTASSIUM CHLORIDE, CALCIUM CHLORIDE 600; 310; 30; 20 MG/100ML; MG/100ML; MG/100ML; MG/100ML
INJECTION, SOLUTION INTRAVENOUS CONTINUOUS
Status: DISCONTINUED | OUTPATIENT
Start: 2024-03-06 | End: 2024-03-06

## 2024-03-06 RX ORDER — ACETAMINOPHEN 10 MG/ML
INJECTION, SOLUTION INTRAVENOUS AS NEEDED
Status: DISCONTINUED | OUTPATIENT
Start: 2024-03-06 | End: 2024-03-06 | Stop reason: SURG

## 2024-03-06 RX ORDER — SCOLOPAMINE TRANSDERMAL SYSTEM 1 MG/1
1 PATCH, EXTENDED RELEASE TRANSDERMAL ONCE
Status: DISCONTINUED | OUTPATIENT
Start: 2024-03-06 | End: 2024-03-06 | Stop reason: HOSPADM

## 2024-03-06 RX ORDER — EPHEDRINE SULFATE 50 MG/ML
INJECTION INTRAVENOUS AS NEEDED
Status: DISCONTINUED | OUTPATIENT
Start: 2024-03-06 | End: 2024-03-06 | Stop reason: SURG

## 2024-03-06 RX ADMIN — NEOSTIGMINE METHYLSULFATE 3.5 MG: 1 INJECTION, SOLUTION INTRAVENOUS at 10:32:00

## 2024-03-06 RX ADMIN — DEXAMETHASONE SODIUM PHOSPHATE 4 MG: 10 INJECTION, SOLUTION INTRAMUSCULAR; INTRAVENOUS at 07:50:00

## 2024-03-06 RX ADMIN — ROCURONIUM BROMIDE 20 MG: 10 INJECTION, SOLUTION INTRAVENOUS at 09:08:00

## 2024-03-06 RX ADMIN — EPHEDRINE SULFATE 10 MG: 50 INJECTION INTRAVENOUS at 08:20:00

## 2024-03-06 RX ADMIN — GLYCOPYRROLATE 0.4 MG: 0.2 INJECTION, SOLUTION INTRAMUSCULAR; INTRAVENOUS at 10:32:00

## 2024-03-06 RX ADMIN — SODIUM CHLORIDE, SODIUM LACTATE, POTASSIUM CHLORIDE, CALCIUM CHLORIDE: 600; 310; 30; 20 INJECTION, SOLUTION INTRAVENOUS at 10:57:00

## 2024-03-06 RX ADMIN — DEXAMETHASONE SODIUM PHOSPHATE 4 MG: 4 MG/ML VIAL (ML) INJECTION at 07:57:00

## 2024-03-06 RX ADMIN — CEFAZOLIN SODIUM/WATER 2 G: 2 G/20 ML SYRINGE (ML) INTRAVENOUS at 07:51:00

## 2024-03-06 RX ADMIN — LIDOCAINE HYDROCHLORIDE 50 MG: 10 INJECTION, SOLUTION EPIDURAL; INFILTRATION; INTRACAUDAL; PERINEURAL at 07:35:00

## 2024-03-06 RX ADMIN — ROCURONIUM BROMIDE 10 MG: 10 INJECTION, SOLUTION INTRAVENOUS at 08:49:00

## 2024-03-06 RX ADMIN — SODIUM CHLORIDE, SODIUM LACTATE, POTASSIUM CHLORIDE, CALCIUM CHLORIDE: 600; 310; 30; 20 INJECTION, SOLUTION INTRAVENOUS at 07:33:00

## 2024-03-06 RX ADMIN — KETAMINE HYDROCHLORIDE 15 MG: 50 INJECTION, SOLUTION INTRAMUSCULAR; INTRAVENOUS at 07:57:00

## 2024-03-06 RX ADMIN — ONDANSETRON 4 MG: 2 INJECTION INTRAMUSCULAR; INTRAVENOUS at 09:48:00

## 2024-03-06 RX ADMIN — ACETAMINOPHEN 1000 MG: 10 INJECTION, SOLUTION INTRAVENOUS at 09:48:00

## 2024-03-06 RX ADMIN — ROCURONIUM BROMIDE 10 MG: 10 INJECTION, SOLUTION INTRAVENOUS at 08:15:00

## 2024-03-06 RX ADMIN — KETOROLAC TROMETHAMINE 30 MG: 30 INJECTION, SOLUTION INTRAMUSCULAR; INTRAVENOUS at 09:48:00

## 2024-03-06 RX ADMIN — ROCURONIUM BROMIDE 70 MG: 10 INJECTION, SOLUTION INTRAVENOUS at 07:46:00

## 2024-03-06 NOTE — ANESTHESIA POSTPROCEDURE EVALUATION
[facility]    Freda Teran Patient Status:  Hospital Outpatient Surgery   Age/Gender 38 year old female MRN [id number]   Location [facility] POST ANESTHESIA CARE UNIT Attending Jeremías Adame MD   Hosp Day # 0 PCP Bennie Cantu MD       Anesthesia Post-op Note    ROBOTIC LAPAROSCOPIC UMBILICAL HERNIA REPAIR WITH MESH; FASCIAL DEFECT 3.5CM    Procedure Summary       Date: 03/06/24 Room / Location:  MAIN OR 08 /  MAIN OR    Anesthesia Start: 0732 Anesthesia Stop: 1100    Procedure: ROBOTIC LAPAROSCOPIC UMBILICAL HERNIA REPAIR WITH MESH; FASCIAL DEFECT 3.5CM (Abdomen) Diagnosis:       Umbilical hernia without obstruction and without gangrene      (Umbilical hernia without obstruction and without gangrene [K42.9])    Surgeons: Jeremías Adame MD Anesthesiologist: Jez Triplett MD    Anesthesia Type: general ASA Status: 3            Anesthesia Type: No value filed.    Vitals Value Taken Time   /89 03/06/24 1100   Temp 99.3 03/06/24 1103   Pulse 107 03/06/24 1102   Resp 22 03/06/24 1102   SpO2 97 % 03/06/24 1102   Vitals shown include unfiled device data.    Patient Location: PACU    Anesthesia Type: general    Airway Patency: patent    Postop Pain Control: adequate    Mental Status: mildly sedated but able to meaningfully participate in the post-anesthesia evaluation    Nausea/Vomiting: none    Cardiopulmonary/Hydration status: stable euvolemic    Complications: no apparent anesthesia related complications    Postop vital signs: stable    Dental Exam: Unchanged from Preop    Patient to be discharged from PACU when criteria met.

## 2024-03-06 NOTE — ANESTHESIA PROCEDURE NOTES
Airway  Date/Time: 3/6/2024 7:38 AM  Urgency: elective      General Information and Staff    Patient location during procedure: OR  Anesthesiologist: Jez Triplett MD  Resident/CRNA: Tristian Kaminski CRNA  Performed: CRNA   Performed by: Tristian Kaminski CRNA  Authorized by: Jez Triplett MD      Indications and Patient Condition  Indications for airway management: anesthesia  Sedation level: deep  Preoxygenated: yes  Patient position: sniffing  Mask difficulty assessment: 0 - not attempted    Final Airway Details  Final airway type: endotracheal airway      Successful airway: ETT  Cuffed: yes   Successful intubation technique: direct laryngoscopy  Facilitating devices/methods: rapid sequence intubation  Endotracheal tube insertion site: oral  Blade: Dayday  Blade size: #4  ETT size (mm): 7.5    Cormack-Lehane Classification: grade I - full view of glottis  Placement verified by: capnometry   Measured from: lips  ETT to lips (cm): 21  Number of attempts at approach: 1

## 2024-03-06 NOTE — OPERATIVE REPORT
[facility]  Operative Note    Freda Teran Location: OR   CSN [id number] MRN [id number]    1985 Age 38 year old   Admission Date 3/6/2024 Operation Date 3/6/2024   Attending Physician Jeremías Adame MD Operating Physician Jeremías Adame MD   PCP Bennie Cantu MD          Patient Name: Freda Teran    Preoperative Diagnosis: Umbilical hernia without obstruction and without gangrene [K42.9]    Postoperative Diagnosis: Same as preoperative diagnosis    Primary Surgeon: Jeremías Adame MD    Assistant: Carol BOSS    Anesthesia: General    Procedures: Robotic repair of umbilical hernia with mesh, fascial defect measured 3.5 cm    Implants: 9 cm circular synthetic Synecor preperitoneal hernia mesh    Specimen: None    Drains: None    Estimated Blood Loss: 5 cc    Complications: None immediate    Condition: Stable    Indications for Surgery:   This is a very nice 38-year-old female referred to me with concern for a symptomatic umbilical hernia.  Patient has noticed a bulge beneath her umbilicus for her whole life. Over the last few weeks, she feels the bulge is gotten bigger and is now causing her pain and discomfort.  She describes a constant uncomfortable feeling in the area.  She does have pain with coughing or sneezing.  No obstructive symptoms.  No prior hernia repair. Prior abdominal surgery includes hysterectomy.  She does not take any blood thinners. She is currently a student and not working.  She is morbidly obese with BMI 46.     Bedside exam confirms the presence of a large, obvious umbilical hernia.  The fascial defect measures around 1.5-2 cm in diameter.  The contents are reducible.     I recommend scheduling an elective robotic umbilical hernia repair with mesh, possible open.  The details of this procedure were discussed including the expected recovery time, risks, benefits and alternatives.  Patient expressed understanding and was agreeable to schedule surgery with me.       Patient presents for elective surgery today.  Consent was signed.  All questions answered.    Surgical Findings:   Umbilical hernia containing omentum and preperitoneal fat.  Fascial defect measured approximately 3.5 x 3 cm    Description of Procedure:   Patient was brought to the operating room and laid supine on the OR table.  Bilateral sequential compression devices were placed. Preoperative antibiotics were given. Patient was induced under general endotracheal anesthesia. Both arms were tucked with all pressure points well-padded.  A Lawson catheter was inserted using sterile technique.  The anesthesia team performed a preoperative TAP block.  The abdomen was prepped and draped in the usual sterile fashion. A timeout was performed.     I began by establishing pneumoperitoneum through an 8 mm skin incision in the left upper quadrant. There was appropriately low opening pressure.  The Veress needle was removed and an 8 mm robotic trocar was inserted.  There was no evidence of underlying visceral injury. Two additional 8 mm robotic trocars were placed under direct vision in the left lateral abdomen spaced about 10 cm apart, one in line with the umbilicus and one in the left lower quadrant.  The da Prmoise Xi robotic platform was brought into the field and docked.     Examination of the abdomen revealed an umbilical hernia containing omentum and preperitoneal fat.  The omentum was pulled out of the hernia sac and any adhesions between the omentum and hernia sac were divided with cauterized scissors.  I began by scoring an approximately 10 cm long line in the peritoneum at the left rectus abdominis muscle.  The peritoneum and preperitoneal fat was pulled down and  from the overlying posterior fascia using a combination of blunt and sharp dissection with cauterized scissors.  This dissection was continued circumferentially around the hernia defect.  The hernia sac with preperitoneal fat was pulled out of the  hernia defect. The dissection continued towards the right until we reached the right rectus abdominis muscle. Hemostasis was achieved throughout the dissection using electrocautery.  The fascial defect measured 3.5 x 3 cm.  The fascial defect was closed primarily using a running nonabsorbable O VLoc sutures x2.       A 9 cm circular piece of synthetic Synecor preperitoneal ventral hernia mesh was marked and introduced into the field.  I used the tail of the fascial closure suture to hold the center of the mesh up over the hernia defect. The corners of the mesh were secured to the abdominal wall using interrupted 2-0 Vicryl sutures.  An additional 2-0 Vicryl sutures were used to tack the central portion of the mesh to the abdominal wall. All needle and suture material were successfully retrieved. The mesh was lying very nicely flat against the abdominal wall. I sutured the peritoneal flap back to its left-sided cut edge using a running 2-0 VLoc suture.  Any small defects in the peritoneal flap were closed using the 2-0 VLoc suture and 2-0 Vicryl suture.  The mesh was completely covered with peritoneum at the conclusion of this closure.       The da Promise Xi robotic platform was undocked.  The patient was leveled.  Each trocar was removed under direct vision. Each incision appeared hemostatic. Pneumoperitoneum was evacuated.  Each skin incision was anesthetized using a total of 30 cc of 0.25% Marcaine. Each skin incision was closed using interrupted 4-0 Monocryl suture in a subcuticular fashion. The skin was cleaned and dried and skin glue was placed over each incision as a final dressing.        The tadeo catheter was removed. The patient was awakened from anesthesia, extubated and transported to the postanesthesia care unit in stable condition.  All sponge, needle and instrument counts were correct at the end of the case.  I was present for the entire case.      Jeremías Adame MD  3/6/2024  11:07 AM

## 2024-03-06 NOTE — ANESTHESIA PROCEDURE NOTES
Regional Block    Date/Time: 3/6/2024 7:45 AM    Performed by: Tristian Kaminski CRNA  Authorized by: Jez Triplett MD      General Information and Staff    Start Time:  3/6/2024 7:45 AM  End Time:  3/6/2024 7:51 AM  Anesthesiologist:  Jez Triplett MD  CRNA:  Tristian Kaminski CRNA  Performed by:  CRNA  Patient Location:  OR    Block Placement: Post Induction  Site Identification: real time ultrasound guided and image stored and retrievable    Block site/laterality marked before start: site marked  Reason for Block: at surgeon's request and post-op pain management    Preanesthetic Checklist: 2 patient identifers, IV checked, site marked, risks and benefits discussed, monitors and equipment checked, pre-op evaluation, timeout performed, anesthesia consent, sterile technique used, no prohibitive neurological deficits and no local skin infection at insertion site      Procedure Details    Patient Position:  Supine  Prep: ChloraPrep    Monitoring:  Cardiac monitor, continuous pulse ox, blood pressure cuff and heart rate  Block Type:  TAP  Laterality:  Bilateral  Injection Technique:  Single-shot    Needle    Needle Type:  Short-bevel and echogenic  Needle Gauge:  21 G  Needle Length:  110 mm  Needle Localization:  Ultrasound guidance  Reason for Ultrasound Use: appropriate spread of the medication was noted in real time and no ultrasound evidence of intravascular and/or intraneural injection            Assessment    Injection Assessment:  Good spread noted, negative resistance, negative aspiration for heme, incremental injection and low pressure  Heart Rate Change: No    - Patient tolerated block procedure well without evidence of immediate block related complications.     Medications  3/6/2024 7:45 AM      Additional Comments    Medication:  Bupivacaine 0.25% 50ml + 4mg PF decadron: 25ml per side

## 2024-03-06 NOTE — DISCHARGE INSTRUCTIONS
Post-Surgical Discharge Instructions    Jeremías Adame MD    Pain: You may take acetaminophen (Tylenol) up to 650 mg every 6 hours as needed for mild-moderate pain. You may take ibuprofen (Motrin) up to 600 mg every 6 hours as needed for mild-moderate pain. Take narcotic pain medication as needed for severe pain per your prescription. Do not drive while taking narcotic pain medication. Many patients take a stool softener as narcotics are known to cause constipation. Okay to use ice packs over abdomen    Diet:  No restrictions.    Activity:  No heavy lifting greater than 10 lbs and no exercising for a total of 6 weeks from the date of surgery.  You may walk and climb stairs with moderation. If your abdomen is more uncomfortable than the day before, you need to be less active as you may be pulling on your stitches.    Bathing:  You may shower as often as you would like, but no submerging the incisions under water for a total of 2 weeks from the date of surgery.  This includes no swimming, no baths, and no hot-tubs.      Wound:  Keep the wound dry.  No dressing is necessary unless there is drainage coming from the wound.  If the drainage is excessive or looks like pus, please call our office.    Driving: You may drive provided that you are no longer taking your narcotic pain medication.      Bowel Function:  After surgery, your bowel movements will be irregular.  It is normal to have up to 3 bowel movements a day or as low as 1 bowel movement every 3 days.  Occasionally, your movements may be even more irregular than this.  As long as you are not vomiting or have a fever over 100.3, you don’t need to be overly concerned.      Return to Work:  Most patients return to work after 1-2 weeks from the date of their surgery.  You will still have a lifting restriction of no greater than 10 lbs from the date of your surgery until 6 weeks.  If your work requires heavy lifting, you will need to stay off work for 6 weeks.  When  you are ready to return to work, please call the office and we will send a work release to your employer.      Appointment: Please call our office for an appointment in 10-14 days after surgery, unless otherwise instructed.  This will allow ample time for the swelling and soreness to resolve before your wound is examined. If you have fevers, chills, or if you are concerned about your wound, please call us immediately at [phone].      Thank you for entrusting us with your care.

## 2024-03-06 NOTE — ANESTHESIA PREPROCEDURE EVALUATION
PRE-OP EVALUATION    Patient Name: Freda Teran    Admit Diagnosis: Umbilical hernia without obstruction and without gangrene [K42.9]    Pre-op Diagnosis: Umbilical hernia without obstruction and without gangrene [K42.9]    ROBOTIC LAPAROSCOPIC VENTRAL HERNIA REPAIR WITH MESH    Anesthesia Procedure: ROBOTIC LAPAROSCOPIC VENTRAL HERNIA REPAIR WITH MESH    Surgeon(s) and Role:     * Jeremías Adame MD - Primary    Pre-op vitals reviewed.  Temp: 98.3 °F (36.8 °C)  Pulse: 88  Resp: 16  BP: 157/98  SpO2: 96 %  Body mass index is 46.23 kg/m².    Current medications reviewed.  Hospital Medications:   acetaminophen (Tylenol Extra Strength) tab 1,000 mg  1,000 mg Oral Once    scopolamine (Transderm-Scop) 1 MG/3DAYS patch 1 patch  1 patch Transdermal Once    glucose (Dex4) 15 GM/59ML oral liquid 15 g  15 g Oral Q15 Min PRN    Or    glucose (Glutose) 40% oral gel 15 g  15 g Oral Q15 Min PRN    Or    glucose-vitamin C (Dex-4) chewable tab 4 tablet  4 tablet Oral Q15 Min PRN    Or    dextrose 50% injection 50 mL  50 mL Intravenous Q15 Min PRN    Or    glucose (Dex4) 15 GM/59ML oral liquid 30 g  30 g Oral Q15 Min PRN    Or    glucose (Glutose) 40% oral gel 30 g  30 g Oral Q15 Min PRN    Or    glucose-vitamin C (Dex-4) chewable tab 8 tablet  8 tablet Oral Q15 Min PRN    lactated ringers infusion   Intravenous Continuous    [COMPLETED] heparin (Porcine) 5000 UNIT/ML injection 5,000 Units  5,000 Units Subcutaneous Once    ceFAZolin (Ancef) 2 g in 20mL IV syringe premix  2 g Intravenous Once    ceFAZolin (Ancef) 2 g/20mL IV syringe premix           Outpatient Medications:     Medications Prior to Admission   Medication Sig Dispense Refill Last Dose    cyclobenzaprine 10 MG Oral Tab Take 1 tablet (10 mg total) by mouth 3 (three) times daily. 30 tablet 0 Past Week    traMADol 50 MG Oral Tab Take 1 tablet (50 mg total) by mouth every 8 (eight) hours as needed for Pain. 21 tablet 0 2/27/2024    citalopram 40 MG Oral Tab Take 1  tablet (40 mg total) by mouth daily. 90 tablet 1 3/5/2024    metFORMIN  MG Oral Tablet 24 Hr Take 2 tablets (1,000 mg total) by mouth daily.   3/5/2024    rosuvastatin 10 MG Oral Tab Take 1 tablet (10 mg total) by mouth nightly. 30 tablet 2 3/5/2024    ubrogepant 100 MG Oral Tab Take 100 mg by mouth as needed.   More than a month    Glucose Blood (ONETOUCH ULTRA) In Vitro Strip Use 1-2 times daily to check blood sugar as directed.  Dx E11.9       Blood Glucose Monitoring Suppl (ONETOUCH VERIO FLEX SYSTEM) w/Device Does not apply Kit 1 each by Other route as needed.       OneTouch UltraSoft Lancets Does not apply Misc 1 each by Other route as needed.       albuterol 108 (90 Base) MCG/ACT Inhalation Aero Soln Inhale 2 puffs into the lungs every 4 (four) hours as needed for Wheezing or Shortness of Breath.   More than a month    hydrOXYzine 25 MG Oral Tab Take 1 tablet (25 mg total) by mouth 3 (three) times daily as needed for Anxiety.   More than a month       Allergies: Black walnut pollen allergy skin test      Anesthesia Evaluation    Patient summary reviewed.    Anesthetic Complications           GI/Hepatic/Renal                (+) liver disease                 Cardiovascular                (+) obesity                                       Endo/Other      (+) diabetes                            Pulmonary    Negative pulmonary ROS.                       Neuro/Psych      (+) depression                                Past Surgical History:   Procedure Laterality Date      2009    HYSTERECTOMY  2022    ovaries remain, everything else removed     Social History     Socioeconomic History    Marital status: Single   Tobacco Use    Smoking status: Former     Packs/day: 1.00     Years: 19.00     Additional pack years: 0.00     Total pack years: 19.00     Types: Cigarettes     Quit date: 2019     Years since quittin.8    Smokeless tobacco: Never   Vaping Use    Vaping Use: Former     Substances: Nicotine    Devices: Disposable   Substance and Sexual Activity    Alcohol use: Yes     Comment: possibly one or two drinks per month    Drug use: Never   Other Topics Concern    Caffeine Concern Yes     Comment: drink energy drinks 4-5x per week    Exercise Yes     Comment: limited due to knee pain and abdominal pain    Seat Belt No    Special Diet No    Stress Concern Yes    Weight Concern Yes     History   Drug Use Unknown     Available pre-op labs reviewed.               Airway      Mallampati: III       Cardiovascular    Cardiovascular exam normal.         Dental    Dentition appears grossly intact         Pulmonary    Pulmonary exam normal.                 Other findings              ASA: 3   Plan: general             Plan/risks discussed with: patient                Present on Admission:  **None**

## 2024-03-06 NOTE — ADDENDUM NOTE
Addendum  created 03/06/24 1225 by Tristian Kaminski CRNA    Clinical Note Signed, Intraprocedure Blocks edited, SmartForm saved

## 2024-03-06 NOTE — H&P
New Patient Visit Note       Active Problems      No diagnosis found.      Chief Complaint   No chief complaint on file.      History of Present Illness   This is a very nice 38-year-old female referred to me with concern for a symptomatic umbilical hernia.  Patient has noticed a bulge beneath her umbilicus for her whole life.  Over the last few weeks, she feels the bulge is gotten bigger and is now causing her pain and discomfort.  She describes a constant uncomfortable feeling in the area.  She does have pain with coughing or sneezing.  No obstructive symptoms.  No prior hernia repair.  Prior abdominal surgery includes hysterectomy.  She does not take any blood thinners.  She is currently a student and not working.  She is morbidly obese with BMI 46.    Allergies  Freda is allergic to black walnut pollen allergy skin test.    Past Medical / Surgical / Social / Family History    The past medical and past surgical history have been reviewed by me today.    Past Medical History:   Diagnosis Date    Anxiety 5yrs    Arthritis     left hand due to a break 20 yrs ago    Depression 15 yrs    Diabetes (HCC) 20    Disorder of liver     FATTY LIVER    History of COVID-19     2024, NOT HOSPITALIZED    Hyperlipidemia 5 yrs    fatty liver also    Migraines     Obesity always     Past Surgical History:   Procedure Laterality Date      2009    HYSTERECTOMY  2022    ovaries remain, everything else removed       The family history and social history have been reviewed by me today.    Family History   Problem Relation Age of Onset    Diabetes Mother         type 2    Heart Disorder Mother         quadruple bypass @ 40    Hypertension Mother     Lipids Mother     Pulmonary Disease Mother         copd and emphysema    Diabetes Father         type 2    Heart Disorder Father         mitral valve    Hypertension Father     Lipids Father     Pulmonary Disease Father         copd    Stroke Father      Diabetes Sister         type 2    Lipids Sister     Obesity Sister     Diabetes Maternal Grandmother         type 2    Cancer Paternal Grandmother         colon cancer    Diabetes Paternal Grandmother         type 2    Diabetes Daughter         type 1    Lipids Daughter      Social History     Socioeconomic History    Marital status: Single   Tobacco Use    Smoking status: Former     Packs/day: 1.00     Years: 19.00     Additional pack years: 0.00     Total pack years: 19.00     Types: Cigarettes     Quit date: 2019     Years since quittin.8    Smokeless tobacco: Never   Vaping Use    Vaping Use: Former    Substances: Nicotine    Devices: Disposable   Substance and Sexual Activity    Alcohol use: Yes     Comment: possibly one or two drinks per month    Drug use: Never   Other Topics Concern    Caffeine Concern Yes     Comment: drink energy drinks 4-5x per week    Exercise Yes     Comment: limited due to knee pain and abdominal pain    Seat Belt No    Special Diet No    Stress Concern Yes    Weight Concern Yes      No current outpatient medications on file.      Review of Systems  A 10 point review of systems was performed and negative unless otherwise documented per HPI.    Physical Findings   BP (!) 157/98 (BP Location: Left arm)   Pulse 88   Temp 98.3 °F (36.8 °C) (Temporal)   Resp 16   Ht 63\"   Wt 261 lb (118.4 kg)   SpO2 96%   BMI 46.23 kg/m²   Physical Exam  Vitals and nursing note reviewed. Exam conducted with a chaperone present.   Constitutional:       General: She is not in acute distress.  HENT:      Head: Normocephalic and atraumatic.      Mouth/Throat:      Mouth: Mucous membranes are moist.   Cardiovascular:      Rate and Rhythm: Normal rate and regular rhythm.   Pulmonary:      Effort: Pulmonary effort is normal.   Abdominal:      General: There is no distension.      Palpations: Abdomen is soft.      Tenderness: There is no abdominal tenderness.      Hernia: A hernia (There is a large,  obvious umbilical hernia.  The fascial defect measures around 1.5-2 cm in diameter.  The contents are reducible.) is present.      Comments: Well-healed laparoscopic scars in the upper abdomen   Musculoskeletal:         General: No deformity.   Skin:     General: Skin is warm and dry.   Neurological:      General: No focal deficit present.      Mental Status: She is alert.   Psychiatric:         Mood and Affect: Mood normal.             Assessment   No diagnosis found.    This is a very nice 38-year-old female referred to me with concern for a symptomatic umbilical hernia.  Patient has noticed a bulge beneath her umbilicus for her whole life.  Over the last few weeks, she feels the bulge is gotten bigger and is now causing her pain and discomfort.  She describes a constant uncomfortable feeling in the area.  She does have pain with coughing or sneezing.  No obstructive symptoms.  No prior hernia repair.  Prior abdominal surgery includes hysterectomy.  She does not take any blood thinners.  She is currently a student and not working.  She is morbidly obese with BMI 46.    Bedside exam confirms the presence of a large, obvious umbilical hernia.  The fascial defect measures around 1.5-2 cm in diameter.  The contents are reducible.    Plan  I recommend scheduling an elective robotic umbilical hernia repair with mesh, possible open.  The details of this procedure were discussed including the expected recovery time, risks, benefits and alternatives.  Patient expressed understanding and was agreeable to schedule surgery with me.  Surgery has been tentatively scheduled for 1/24/2024.      No orders of the defined types were placed in this encounter.      Imaging & Referrals   VITAL SIGNS  NURSING COMMUNICATION  NURSING COMMUNICATION  NURSING COMMUNICATION  ACCU-CHEK  NOTIFY PHYSICIAN (SPECIFY)  PLACE PIV  ACTIVITY AS TOLERATED  HEIGHT AND WEIGHT  INITIATE ADULT PREOP PROPHYLACTIC ABX PROTOCOL  VERIFY INFORMED  CONSENT  NPO  VITAL SIGNS - NOTIFY PHYSICIAN  NOTIFY PHYSICIAN (SPECIFY)  NOTIFY PHYSICIAN (SPECIFY)  INITIATE ALGORITHM FOR HYPOGLYCEMIA FOR ADULTS (NON-PREG)  NURSING COMMUNICATION    Follow Up  No follow-ups on file.    Jeremías Adame MD

## 2024-03-06 NOTE — ADDENDUM NOTE
Addendum  created 03/06/24 1222 by Tristian Kaminski CRNA    Intraprocedure Meds edited, Orders acknowledged in Narrator

## 2024-04-02 ENCOUNTER — OFFICE VISIT (OUTPATIENT)
Facility: LOCATION | Age: 39
End: 2024-04-02
Payer: MEDICAID

## 2024-04-02 VITALS — HEART RATE: 92 BPM | TEMPERATURE: 97 F

## 2024-04-02 DIAGNOSIS — K42.9 UMBILICAL HERNIA WITHOUT OBSTRUCTION AND WITHOUT GANGRENE: Primary | ICD-10-CM

## 2024-04-02 PROCEDURE — 99212 OFFICE O/P EST SF 10 MIN: CPT | Performed by: STUDENT IN AN ORGANIZED HEALTH CARE EDUCATION/TRAINING PROGRAM

## 2024-04-02 NOTE — PROGRESS NOTES
Follow Up Visit Note       Active Problems      No diagnosis found.      Chief Complaint   Chief Complaint   Patient presents with    Post-Op     PO 3/6 ROBOTIC LAPAROSCOPIC UMBILICAL HERNIA REPAIR WITH MESH; FASCIAL DEFECT 3.5CM- pt reports one of the incision suture is coming through, no other new concerns          History of Present Illness  Patient is a 38 year old female seen 4 weeks postop after a robotic umbilical hernia repair with mesh. Patient states that she is doing well. Patient reports very little pain with movement or activity near incision sites that improves with rest.     Patient denies nausea or vomiting.  Patient denies fever or chills.  Patient reports regular flatus and bowel movements.   Patient states she can feel some suture protruding from the most superior incision site      Allergies  Freda is allergic to black walnut pollen allergy skin test.    Past Medical / Surgical / Social / Family History    The past medical and past surgical history have been reviewed by me today.    Past Medical History:   Diagnosis Date    Anxiety 5yrs    Arthritis     left hand due to a break 20 yrs ago    Depression 15 yrs    Diabetes (HCC) 20    Disorder of liver     FATTY LIVER    History of COVID-19     2024, NOT HOSPITALIZED    Hyperlipidemia 5 yrs    fatty liver also    Migraines     Obesity always     Past Surgical History:   Procedure Laterality Date      2009    HYSTERECTOMY  2022    ovaries remain, everything else removed       The family history and social history have been reviewed by me today.    Family History   Problem Relation Age of Onset    Diabetes Mother         type 2    Heart Disorder Mother         quadruple bypass @ 40    Hypertension Mother     Lipids Mother     Pulmonary Disease Mother         copd and emphysema    Diabetes Father         type 2    Heart Disorder Father         mitral valve    Hypertension Father     Lipids Father     Pulmonary Disease  Father         copd    Stroke Father     Diabetes Sister         type 2    Lipids Sister     Obesity Sister     Diabetes Maternal Grandmother         type 2    Cancer Paternal Grandmother         colon cancer    Diabetes Paternal Grandmother         type 2    Diabetes Daughter         type 1    Lipids Daughter      Social History     Socioeconomic History    Marital status: Single   Tobacco Use    Smoking status: Former     Packs/day: 1.00     Years: 19.00     Additional pack years: 0.00     Total pack years: 19.00     Types: Cigarettes     Quit date: 2019     Years since quittin.8    Smokeless tobacco: Never   Vaping Use    Vaping Use: Former    Substances: Nicotine    Devices: Disposable   Substance and Sexual Activity    Alcohol use: Yes     Comment: possibly one or two drinks per month    Drug use: Never   Other Topics Concern    Caffeine Concern Yes     Comment: drink energy drinks 4-5x per week    Exercise Yes     Comment: limited due to knee pain and abdominal pain    Seat Belt No    Special Diet No    Stress Concern Yes    Weight Concern Yes        Current Outpatient Medications:     traMADol 50 MG Oral Tab, Take 1 tablet (50 mg total) by mouth every 6 (six) hours as needed for Pain., Disp: 15 tablet, Rfl: 0    cyclobenzaprine 10 MG Oral Tab, Take 1 tablet (10 mg total) by mouth 3 (three) times daily., Disp: 30 tablet, Rfl: 0    citalopram 40 MG Oral Tab, Take 1 tablet (40 mg total) by mouth daily., Disp: 90 tablet, Rfl: 1    ubrogepant 100 MG Oral Tab, Take 100 mg by mouth as needed., Disp: , Rfl:     metFORMIN  MG Oral Tablet 24 Hr, Take 2 tablets (1,000 mg total) by mouth daily., Disp: , Rfl:     Glucose Blood (ONETOUCH ULTRA) In Vitro Strip, Use 1-2 times daily to check blood sugar as directed.  Dx E11.9, Disp: , Rfl:     Blood Glucose Monitoring Suppl (ONETOUCH VERIO FLEX SYSTEM) w/Device Does not apply Kit, 1 each by Other route as needed., Disp: , Rfl:     OneTouch UltraSoft Lancets  Does not apply Misc, 1 each by Other route as needed., Disp: , Rfl:     albuterol 108 (90 Base) MCG/ACT Inhalation Aero Soln, Inhale 2 puffs into the lungs every 4 (four) hours as needed for Wheezing or Shortness of Breath., Disp: , Rfl:     rosuvastatin 10 MG Oral Tab, Take 1 tablet (10 mg total) by mouth nightly., Disp: 30 tablet, Rfl: 2    hydrOXYzine 25 MG Oral Tab, Take 1 tablet (25 mg total) by mouth 3 (three) times daily as needed for Anxiety., Disp: , Rfl:      Review of Systems  The Review of Systems has been reviewed by me during today.  Review of Systems   Constitutional:  Negative for chills and fever.   Cardiovascular:  Negative for chest pain and leg swelling.   Gastrointestinal:  Negative for abdominal distention, constipation, diarrhea, nausea and vomiting.        Physical Findings   Pulse 92   Temp 96.7 °F (35.9 °C) (Temporal)   Physical Exam  Constitutional:       General: She is not in acute distress.  HENT:      Head: Normocephalic and atraumatic.   Pulmonary:      Effort: Pulmonary effort is normal. No respiratory distress.   Abdominal:      General: There is no distension.      Palpations: Abdomen is soft.      Tenderness: There is no abdominal tenderness. There is no guarding or rebound.          Comments: Incisions clean dry and intact without erythema or drainage     Skin:     General: Skin is warm and dry.      Coloration: Skin is not jaundiced.   Neurological:      Mental Status: She is alert.          Assessment   Post-operative state     Plan   Patient doing well, follow up as needed,  Retained suture removed from LUQ incision site  Patient to maintain 20 pound lifting restriction for 6 weeks from the date of surgery   No bathing or dietary restrictions  All patient questions answered and concerns addressed      No orders of the defined types were placed in this encounter.      Imaging & Referrals   None    Follow Up  No follow-ups on file.    This note was initiated by [facility]  [facility].  The PA-S saw the patient in conjunction with me. I independently performed a history, exam, and developed the assessment and plan. I agree with the mentioned assessment and plan and have provided further documentation of my own, if necessary.    She returns for postoperative follow-up after elective robotic umbilical hernia repair with mesh on 3/6/2024.  She is doing very well at this time.  She has some irritation from suture material that is slightly sticking out from the left upper quadrant incision.    She appears well on exam.  The knot from the Monocryl suture in the left upper quadrant incision was cut and removed.  Incisions appear well-healed and approximated.  No signs of infection.  No evidence of recurrent hernia, hematoma or seroma beneath the umbilicus.    Avoid this anything greater than 10-15 pounds for total of 6 weeks after surgery.  No bathing or dietary restrictions.  No further follow-up scheduled, but patient is welcome to see me at anytime in the future with any surgical questions or concerns.    Jeremías Adame MD  [facility]

## 2024-04-30 ENCOUNTER — LAB ENCOUNTER (OUTPATIENT)
Dept: LAB | Age: 39
End: 2024-04-30
Attending: FAMILY MEDICINE
Payer: MEDICAID

## 2024-04-30 DIAGNOSIS — E11.9 TYPE 2 DIABETES MELLITUS WITHOUT COMPLICATION, WITHOUT LONG-TERM CURRENT USE OF INSULIN (HCC): ICD-10-CM

## 2024-04-30 DIAGNOSIS — E78.00 ELEVATED LDL CHOLESTEROL LEVEL: ICD-10-CM

## 2024-04-30 DIAGNOSIS — R79.89 ELEVATED LFTS: ICD-10-CM

## 2024-04-30 LAB
ALBUMIN SERPL-MCNC: 3.7 G/DL (ref 3.4–5)
ALP LIVER SERPL-CCNC: 89 U/L
ALT SERPL-CCNC: 82 U/L
AST SERPL-CCNC: 65 U/L (ref 15–37)
BILIRUB DIRECT SERPL-MCNC: 0.2 MG/DL (ref 0–0.2)
BILIRUB SERPL-MCNC: 0.6 MG/DL (ref 0.1–2)
CHOLEST SERPL-MCNC: 187 MG/DL (ref ?–200)
FASTING PATIENT LIPID ANSWER: YES
HDLC SERPL-MCNC: 69 MG/DL (ref 40–59)
LDLC SERPL CALC-MCNC: 96 MG/DL (ref ?–100)
NONHDLC SERPL-MCNC: 118 MG/DL (ref ?–130)
PROT SERPL-MCNC: 7.7 G/DL (ref 6.4–8.2)
TRIGL SERPL-MCNC: 125 MG/DL (ref 30–149)
VLDLC SERPL CALC-MCNC: 21 MG/DL (ref 0–30)

## 2024-04-30 PROCEDURE — 80076 HEPATIC FUNCTION PANEL: CPT

## 2024-04-30 PROCEDURE — 83036 HEMOGLOBIN GLYCOSYLATED A1C: CPT

## 2024-04-30 PROCEDURE — 36415 COLL VENOUS BLD VENIPUNCTURE: CPT

## 2024-04-30 PROCEDURE — 80061 LIPID PANEL: CPT

## 2024-05-01 LAB — HGBA1C: 9.1 %

## 2024-05-03 DIAGNOSIS — E11.9 TYPE 2 DIABETES MELLITUS WITHOUT COMPLICATION, WITHOUT LONG-TERM CURRENT USE OF INSULIN (HCC): Primary | ICD-10-CM

## 2024-05-03 DIAGNOSIS — R74.8 ELEVATED LIVER ENZYMES: ICD-10-CM

## 2024-05-03 RX ORDER — METFORMIN HYDROCHLORIDE 500 MG/1
TABLET, EXTENDED RELEASE ORAL
Qty: 120 TABLET | Refills: 3 | Status: SHIPPED | OUTPATIENT
Start: 2024-05-03

## 2024-05-06 ENCOUNTER — LAB ENCOUNTER (OUTPATIENT)
Dept: LAB | Age: 39
End: 2024-05-06
Attending: FAMILY MEDICINE
Payer: MEDICAID

## 2024-05-06 DIAGNOSIS — R74.8 ELEVATED LIVER ENZYMES: ICD-10-CM

## 2024-05-06 LAB
CERULOPLASMIN SERPL-MCNC: 30.5 MG/DL (ref 20–60)
DEPRECATED HBV CORE AB SER IA-ACNC: 213 NG/ML
HAV IGM SER QL: NONREACTIVE
HBV CORE IGM SER QL: NONREACTIVE
HBV SURFACE AG SERPL QL IA: NONREACTIVE
HCV AB SERPL QL IA: NONREACTIVE

## 2024-05-06 PROCEDURE — 82728 ASSAY OF FERRITIN: CPT

## 2024-05-06 PROCEDURE — 82390 ASSAY OF CERULOPLASMIN: CPT

## 2024-05-06 PROCEDURE — 80074 ACUTE HEPATITIS PANEL: CPT

## 2024-05-06 PROCEDURE — 36415 COLL VENOUS BLD VENIPUNCTURE: CPT

## 2024-05-29 ENCOUNTER — HOSPITAL ENCOUNTER (OUTPATIENT)
Dept: ULTRASOUND IMAGING | Age: 39
Discharge: HOME | End: 2024-05-29
Attending: FAMILY MEDICINE
Payer: MEDICAID

## 2024-05-29 DIAGNOSIS — R74.8 ELEVATED LIVER ENZYMES: ICD-10-CM

## 2024-05-29 PROCEDURE — 76700 US EXAM ABDOM COMPLETE: CPT | Performed by: FAMILY MEDICINE

## 2024-07-15 DIAGNOSIS — E78.2 MIXED HYPERLIPIDEMIA: ICD-10-CM

## 2024-07-16 RX ORDER — ROSUVASTATIN CALCIUM 10 MG/1
10 TABLET, COATED ORAL NIGHTLY
Qty: 90 TABLET | Refills: 0 | Status: SHIPPED | OUTPATIENT
Start: 2024-07-16

## 2024-08-02 ENCOUNTER — LAB ENCOUNTER (OUTPATIENT)
Dept: LAB | Age: 39
End: 2024-08-02
Attending: FAMILY MEDICINE
Payer: MEDICAID

## 2024-08-02 DIAGNOSIS — E11.9 TYPE 2 DIABETES MELLITUS WITHOUT COMPLICATION, WITHOUT LONG-TERM CURRENT USE OF INSULIN (HCC): ICD-10-CM

## 2024-08-02 PROCEDURE — 83036 HEMOGLOBIN GLYCOSYLATED A1C: CPT

## 2024-08-02 PROCEDURE — 36415 COLL VENOUS BLD VENIPUNCTURE: CPT

## 2024-08-04 LAB — HGBA1C: 7.9 %

## 2024-08-08 ENCOUNTER — OFFICE VISIT (OUTPATIENT)
Dept: FAMILY MEDICINE CLINIC | Facility: CLINIC | Age: 39
End: 2024-08-08
Payer: MEDICAID

## 2024-08-08 VITALS
RESPIRATION RATE: 16 BRPM | HEIGHT: 63 IN | DIASTOLIC BLOOD PRESSURE: 84 MMHG | WEIGHT: 255 LBS | HEART RATE: 92 BPM | OXYGEN SATURATION: 98 % | SYSTOLIC BLOOD PRESSURE: 110 MMHG | BODY MASS INDEX: 45.18 KG/M2

## 2024-08-08 DIAGNOSIS — Z00.00 ROUTINE GENERAL MEDICAL EXAMINATION AT A HEALTH CARE FACILITY: Primary | ICD-10-CM

## 2024-08-08 DIAGNOSIS — E11.9 TYPE 2 DIABETES MELLITUS WITHOUT COMPLICATION, WITHOUT LONG-TERM CURRENT USE OF INSULIN (HCC): ICD-10-CM

## 2024-08-08 DIAGNOSIS — Z12.31 ENCOUNTER FOR SCREENING MAMMOGRAM FOR MALIGNANT NEOPLASM OF BREAST: ICD-10-CM

## 2024-08-08 DIAGNOSIS — Z00.00 LABORATORY EXAM ORDERED AS PART OF ROUTINE GENERAL MEDICAL EXAMINATION: Primary | ICD-10-CM

## 2024-08-08 RX ORDER — EMPAGLIFLOZIN 25 MG/1
25 TABLET, FILM COATED ORAL DAILY
Qty: 90 TABLET | Refills: 0 | Status: SHIPPED | OUTPATIENT
Start: 2024-08-08 | End: 2024-11-06

## 2024-08-08 RX ORDER — EMPAGLIFLOZIN 25 MG/1
1 TABLET, FILM COATED ORAL DAILY
Qty: 90 TABLET | Refills: 0 | COMMUNITY
Start: 2024-08-08

## 2024-08-08 NOTE — PROGRESS NOTES
Freda Teran is a 39 year old female who presents for a complete physical exam, no gyn.  HPI:     Chief Complaint   Patient presents with    Wellness Visit       Patient feels well, dental visit up to date, no hearing problem.  Vaccinations up to date.    Exercise: three times per week, occasional.  Diet: watches sugar closely and watches somewhat    Wt Readings from Last 3 Encounters:   24 255 lb (115.7 kg)   24 261 lb (118.4 kg)   01/15/24 268 lb (121.6 kg)      BP Readings from Last 3 Encounters:   24 110/84   24 97/55   01/15/24 130/70     No LMP recorded. Patient has had a hysterectomy.         Current Outpatient Medications   Medication Sig Dispense Refill    rosuvastatin 10 MG Oral Tab Take 1 tablet (10 mg total) by mouth nightly. 90 tablet 0    cyclobenzaprine 10 MG Oral Tab Take 1 tablet (10 mg total) by mouth 3 (three) times daily. 30 tablet 0    ubrogepant 100 MG Oral Tab Take 100 mg by mouth as needed.      Glucose Blood (ONETOUCH ULTRA) In Vitro Strip Use 1-2 times daily to check blood sugar as directed.  Dx E11.9      Blood Glucose Monitoring Suppl (ONETOUCH VERIO FLEX SYSTEM) w/Device Does not apply Kit 1 each by Other route as needed.      albuterol 108 (90 Base) MCG/ACT Inhalation Aero Soln Inhale 2 puffs into the lungs every 4 (four) hours as needed for Wheezing or Shortness of Breath.      hydrOXYzine 25 MG Oral Tab Take 1 tablet (25 mg total) by mouth 3 (three) times daily as needed for Anxiety.        Past Medical History:    Anxiety    Arthritis    left hand due to a break 20 yrs ago    Depression    Diabetes (HCC)    Disorder of liver    FATTY LIVER    History of COVID-19    2024, NOT HOSPITALIZED    Hyperlipidemia    fatty liver also    Migraines    Obesity      Past Surgical History:   Procedure Laterality Date      2009    Hysterectomy  2022    ovaries remain, everything else removed      Family History   Problem Relation Age of Onset     Diabetes Mother         type 2    Heart Disorder Mother         quadruple bypass @ 40    Hypertension Mother     Lipids Mother     Pulmonary Disease Mother         copd and emphysema    Diabetes Father         type 2    Heart Disorder Father         mitral valve    Hypertension Father     Lipids Father     Pulmonary Disease Father         copd    Stroke Father     Diabetes Sister         type 2    Lipids Sister     Obesity Sister     Diabetes Maternal Grandmother         type 2    Cancer Paternal Grandmother         colon cancer    Diabetes Paternal Grandmother         type 2    Diabetes Daughter         type 1    Lipids Daughter       Social History     Socioeconomic History    Marital status: Single   Tobacco Use    Smoking status: Former     Current packs/day: 0.00     Average packs/day: 1 pack/day for 19.0 years (19.0 ttl pk-yrs)     Types: Cigarettes     Start date: 2000     Quit date: 2019     Years since quittin.2    Smokeless tobacco: Never   Vaping Use    Vaping status: Former    Substances: Nicotine    Devices: Disposable   Substance and Sexual Activity    Alcohol use: Yes     Comment: possibly one or two drinks per month    Drug use: Never   Other Topics Concern    Caffeine Concern Yes     Comment: drink energy drinks 4-5x per week    Exercise Yes     Comment: limited due to knee pain and abdominal pain    Seat Belt No    Special Diet No    Stress Concern Yes    Weight Concern Yes     Social Determinants of Health     Financial Resource Strain: Low Risk  (2022)    Received from [facility]    Overall Financial Resource Strain (CARDIA)     Difficulty of Paying Living Expenses: Not very hard   Food Insecurity: No Food Insecurity (2022)    Received from [facility]    Hunger Vital Sign     Worried About Running Out of Food in the Last Year: Never true     Ran Out of Food in the Last Year: Never true   Transportation Needs: No  Transportation Needs (11/30/2022)    Received from [facility]    PRAPARE - Transportation     Lack of Transportation (Medical): No     Lack of Transportation (Non-Medical): No   Physical Activity: Inactive (11/30/2022)    Received from [facility]    Exercise Vital Sign     Days of Exercise per Week: 0 days     Minutes of Exercise per Session: 0 min   Stress: Stress Concern Present (11/30/2022)    Received from [facility] [place] [facility] - Occupational Stress Questionnaire     Feeling of Stress : Very much   Social Connections: Socially Isolated (11/30/2022)    Received from [facility]    Social Connection and Isolation Panel [NHANES]     Frequency of Communication with Friends and Family: Once a week     Frequency of Social Gatherings with Friends and Family: Once a week     Attends Hoahaoism Services: Never     Active Member of Clubs or Organizations: No     Attends Club or Organization Meetings: Never     Marital Status:    Housing Stability: Low Risk  (11/30/2022)    Received from [facility]    Housing Stability Vital Sign     Unable to Pay for Housing in the Last Year: No     Number of Places Lived in the Last Year: 1     In the last 12 months, was there a time when you did not have a steady place to sleep or slept in a shelter (including now)?: No        REVIEW OF SYSTEMS:   GENERAL HEALTH: feels well, no fatigue.  SKIN: denies any unusual skin lesions or rashes  EYES: no visual complaints or deficits  HEENT: denies nasal congestion, sinus pain or sore throat; hearing loss negative   RESPIRATORY: denies shortness of breath, wheezing or cough   CARDIOVASCULAR: denies chest pain, SOB, edema,orthopnea, no palpitations   GI: denies nausea, vomiting, constipation, diarrhea; no rectal bleeding; no heartburn  GENITAL/: no dysuria, urgency or  frequency  MUSCULOSKELETAL: no joint complaints upper or lower extremities  NEURO: no sensory or motor complaint  HEMATOLOGY: denies hx anemia; denies bruising or excessive bleeding  ENDOCRINE: denies excessive thirst or urination; denies unexpected wt gain or wt loss  ALLERGY/IMM.: denies food or seasonal allergies  PSYCH: no symptoms of depression or anxiety      EXAM:   /84   Pulse 92   Resp 16   Ht 5' 3\" (1.6 m)   Wt 255 lb (115.7 kg)   SpO2 98%   BMI 45.17 kg/m²      General: WD/WN in no acute distress.   HEENT: PERRL and EOMI.  OP moist no lesions.  Neck is supple, with no cervical LAD or thyroid abnormalities.   Lungs: are clear to auscultation bilaterally, with no wheeze, rhonchi, or rales.   Heart: is RRR.  S1, S2, with no murmurs,clicks, gallops  Abdomen: is soft,NBS, NT/ND with no HSM.  No rebound or guarding. No CVA tenderness, no hernias.  Neuro: Cranial nerves II-XII normal,no focal abnormalities, and reflexes coordination and gait normal and symmetric.Sensation intact.  Extremities: are symmetric with no cyanosis, clubbing, or edema.  MS: Normal muscles tones, no joints abnormalities.  SKIN: Normal color, turgor, no lesions, rashes or wounds.  PSYCH: Normal affect and mood.          ASSESSMENT AND PLAN:     Freda Teran is a 39 year old female who presents for a complete physical exam.   Diagnoses and all orders for this visit:    Routine general medical examination at a health care facility    Type 2 diabetes mellitus without complication, without long-term current use of insulin ([facility])  -     Empagliflozin (JARDIANCE) 25 MG Oral Tab; Take 25 mg by mouth daily.  -     OPHTHALMOLOGY - EXTERNAL       Pt's was recommended low fat diet and aerobic exercise 30 minutes three times weekly.   Health maintenance.   The patient indicates understanding of these issues and agrees to the plan.  The patient is asked to return for CPX in 1 year.

## 2024-11-16 ENCOUNTER — E-VISIT (OUTPATIENT)
Dept: TELEHEALTH | Age: 39
End: 2024-11-16
Payer: MEDICAID

## 2024-11-16 DIAGNOSIS — R39.9 UTI SYMPTOMS: Primary | ICD-10-CM

## 2024-11-16 PROCEDURE — 99421 OL DIG E/M SVC 5-10 MIN: CPT | Performed by: PHYSICIAN ASSISTANT

## 2024-11-16 NOTE — PROGRESS NOTES
HPI:  Freda Teran is a 39 year old female who presents for an evisit.  See [id number] communications above.    Current Outpatient Medications   Medication Sig Dispense Refill    Empagliflozin (JARDIANCE) 25 MG Oral Tab Take 1 tablet by mouth daily. 90 tablet 0    rosuvastatin 10 MG Oral Tab Take 1 tablet (10 mg total) by mouth nightly. 90 tablet 0    cyclobenzaprine 10 MG Oral Tab Take 1 tablet (10 mg total) by mouth 3 (three) times daily. 30 tablet 0    ubrogepant 100 MG Oral Tab Take 100 mg by mouth as needed.      Glucose Blood (ONETOUCH ULTRA) In Vitro Strip Use 1-2 times daily to check blood sugar as directed.  Dx E11.9      Blood Glucose Monitoring Suppl (ONETOUCH VERIO FLEX SYSTEM) w/Device Does not apply Kit 1 each by Other route as needed.      albuterol 108 (90 Base) MCG/ACT Inhalation Aero Soln Inhale 2 puffs into the lungs every 4 (four) hours as needed for Wheezing or Shortness of Breath.      hydrOXYzine 25 MG Oral Tab Take 1 tablet (25 mg total) by mouth 3 (three) times daily as needed for Anxiety.       Past Medical History:    Anxiety    Arthritis    left hand due to a break 20 yrs ago    Depression    Diabetes (HCC)    Disorder of liver    FATTY LIVER    History of COVID-19    2024, NOT HOSPITALIZED    Hyperlipidemia    fatty liver also    Migraines    Obesity     Past Surgical History:   Procedure Laterality Date      2009    Hysterectomy  2022    ovaries remain, everything else removed       Social History     Socioeconomic History    Marital status: Single   Tobacco Use    Smoking status: Former     Current packs/day: 0.00     Average packs/day: 1 pack/day for 19.0 years (19.0 ttl pk-yrs)     Types: Cigarettes     Start date: 2000     Quit date: 2019     Years since quittin.5    Smokeless tobacco: Never   Vaping Use    Vaping status: Former    Substances: Nicotine    Devices: Disposable   Substance and Sexual Activity    Alcohol use: Yes     Comment:  possibly one or two drinks per month    Drug use: Never   Other Topics Concern    Caffeine Concern Yes     Comment: drink energy drinks 4-5x per week    Exercise Yes     Comment: limited due to knee pain and abdominal pain    Seat Belt No    Special Diet No    Stress Concern Yes    Weight Concern Yes     Social Drivers of Health     Financial Resource Strain: Low Risk  (11/30/2022)    Received from [facility]    Overall Financial Resource Strain (CARDIA)     Difficulty of Paying Living Expenses: Not very hard   Food Insecurity: No Food Insecurity (11/30/2022)    Received from [facility]    Hunger Vital Sign     Worried About Running Out of Food in the Last Year: Never true     Ran Out of Food in the Last Year: Never true   Transportation Needs: No Transportation Needs (11/30/2022)    Received from [facility]    PRAPARE - Transportation     Lack of Transportation (Medical): No     Lack of Transportation (Non-Medical): No   Physical Activity: Inactive (11/30/2022)    Received from [facility]    Exercise Vital Sign     Days of Exercise per Week: 0 days     Minutes of Exercise per Session: 0 min   Stress: Stress Concern Present (11/30/2022)    Received from [facility]    [place] [facility] - Occupational Stress Questionnaire     Feeling of Stress : Very much   Social Connections: Socially Isolated (11/30/2022)    Received from [facility]    Social Connection and Isolation Panel [NHANES]     Frequency of Communication with Friends and Family: Once a week     Frequency of Social Gatherings with Friends and Family: Once a week     Attends Alevism Services: Never     Active Member of Clubs or Organizations: No     Attends Club or Organization Meetings: Never     Marital Status:    Housing Stability: Low Risk  (11/30/2022)    Received from [facility]    Housing Stability Vital Sign     Unable to Pay for Housing in the Last Year: No     Number of Places Lived in the Last Year: 1     Unstable Housing in the Last Year: No          No results found for this or any previous visit (from the past 24 hours).    ASSESSMENT AND PLAN:      ASSESSMENT:   Encounter Diagnosis   Name Primary?    UTI symptoms Yes       PLAN: Meds as below.  See patient Instructions  -Total of 2 minutes spent with patient.    Meds & Refills for this Visit:  Requested Prescriptions      No prescriptions requested or ordered in this encounter       Risks, benefits, and side effects of medication explained and discussed.    There are no Patient Instructions on file for this visit.    The patient indicates understanding of these issues and agrees to the plan.  See attached patient references.  The patient is asked to return if sx's persist or worsen.    Freda Teran understands evisit evaluation is not a substitute for face-to-face examination or emergency care. Patient advised to go to ER or call 911 for worsening symptoms or acute distress.

## 2024-11-17 ENCOUNTER — OFFICE VISIT (OUTPATIENT)
Dept: FAMILY MEDICINE CLINIC | Facility: CLINIC | Age: 39
End: 2024-11-17
Payer: MEDICAID

## 2024-11-17 VITALS
WEIGHT: 247 LBS | SYSTOLIC BLOOD PRESSURE: 132 MMHG | HEIGHT: 63 IN | HEART RATE: 89 BPM | RESPIRATION RATE: 16 BRPM | DIASTOLIC BLOOD PRESSURE: 84 MMHG | TEMPERATURE: 98 F | OXYGEN SATURATION: 100 % | BODY MASS INDEX: 43.77 KG/M2

## 2024-11-17 DIAGNOSIS — B37.31 YEAST INFECTION OF THE VAGINA: Primary | ICD-10-CM

## 2024-11-17 PROCEDURE — 99212 OFFICE O/P EST SF 10 MIN: CPT | Performed by: NURSE PRACTITIONER

## 2024-11-17 RX ORDER — FLUCONAZOLE 150 MG/1
150 TABLET ORAL ONCE
Qty: 2 TABLET | Refills: 0 | Status: SHIPPED | OUTPATIENT
Start: 2024-11-17 | End: 2024-11-17

## 2024-11-17 NOTE — PROGRESS NOTES
CHIEF COMPLAINT:     Chief Complaint   Patient presents with    Gynecologic Exam     I think I have a yeast infection. Tried monistat 1 on Thursday evening and symptoms have not improved, they’ve gotten worse. - Entered by patient       HPI:   Freda Teran is a 39 year old female who presents with the complaint of vaginal symptoms. She reports vaginal itching and swelling for 2-3 days. no associated urinary symptoms. Denies any vaginal bleeding, abdominal pain, flank pain. Nausea, vomiting, fevers, or chills. Tolerates PO well at home. No n/v/d.  Denies any other aggravating or relieving factors at home. Teatment attempts prior to arrival include monistat 1 day.  Pt has had vaginal yeast infections before.       Current Outpatient Medications   Medication Sig Dispense Refill    fluconazole 150 MG Oral Tab Take 1 tablet (150 mg total) by mouth once for 1 dose. Repeat in 72 hours 2 tablet 0    Empagliflozin (JARDIANCE) 25 MG Oral Tab Take 1 tablet by mouth daily. 90 tablet 0    rosuvastatin 10 MG Oral Tab Take 1 tablet (10 mg total) by mouth nightly. 90 tablet 0    cyclobenzaprine 10 MG Oral Tab Take 1 tablet (10 mg total) by mouth 3 (three) times daily. 30 tablet 0    ubrogepant 100 MG Oral Tab Take 100 mg by mouth as needed.      Glucose Blood (ONETOUCH ULTRA) In Vitro Strip Use 1-2 times daily to check blood sugar as directed.  Dx E11.9      Blood Glucose Monitoring Suppl (ONETOUCH VERIO FLEX SYSTEM) w/Device Does not apply Kit 1 each by Other route as needed.      albuterol 108 (90 Base) MCG/ACT Inhalation Aero Soln Inhale 2 puffs into the lungs every 4 (four) hours as needed for Wheezing or Shortness of Breath.      hydrOXYzine 25 MG Oral Tab Take 1 tablet (25 mg total) by mouth 3 (three) times daily as needed for Anxiety.        Past Medical History:    Anxiety    Arthritis    left hand due to a break 20 yrs ago    Depression    Diabetes (HCC)    Disorder of liver    FATTY LIVER    History of COVID-19     2024, NOT HOSPITALIZED    Hyperlipidemia    fatty liver also    Migraines    Obesity      Social History:  Social History     Socioeconomic History    Marital status: Single   Tobacco Use    Smoking status: Former     Current packs/day: 0.00     Average packs/day: 1 pack/day for 19.0 years (19.0 ttl pk-yrs)     Types: Cigarettes     Start date: 2000     Quit date: 2019     Years since quittin.5    Smokeless tobacco: Never   Vaping Use    Vaping status: Former    Substances: Nicotine    Devices: Disposable   Substance and Sexual Activity    Alcohol use: Yes     Comment: possibly one or two drinks per month    Drug use: Never   Other Topics Concern    Caffeine Concern Yes     Comment: drink energy drinks 4-5x per week    Exercise Yes     Comment: limited due to knee pain and abdominal pain    Seat Belt No    Special Diet No    Stress Concern Yes    Weight Concern Yes     Social Drivers of Health     Financial Resource Strain: Low Risk  (2022)    Received from [facility]    Overall Financial Resource Strain (CARDIA)     Difficulty of Paying Living Expenses: Not very hard   Food Insecurity: No Food Insecurity (2022)    Received from [facility]    Hunger Vital Sign     Worried About Running Out of Food in the Last Year: Never true     Ran Out of Food in the Last Year: Never true   Transportation Needs: No Transportation Needs (2022)    Received from [facility]    PRAPARE - Transportation     Lack of Transportation (Medical): No     Lack of Transportation (Non-Medical): No   Physical Activity: Inactive (2022)    Received from [facility]    Exercise Vital Sign     Days of Exercise per Week: 0 days     Minutes of Exercise per Session: 0 min   Stress: Stress Concern Present (2022)    Received from [facility] -  Occupational Stress Questionnaire     Feeling of Stress : Very much   Social Connections: Socially Isolated (11/30/2022)    Received from [facility]    Social Connection and Isolation Panel [NHANES]     Frequency of Communication with Friends and Family: Once a week     Frequency of Social Gatherings with Friends and Family: Once a week     Attends Yarsani Services: Never     Active Member of Clubs or Organizations: No     Attends Club or Organization Meetings: Never     Marital Status:    Housing Stability: Low Risk  (11/30/2022)    [facility] from [facility]    Housing Stability Vital Sign     Unable to Pay for Housing in the Last Year: No     Number of Places Lived in the Last Year: 1     Unstable Housing in the Last Year: No         REVIEW OF SYSTEMS:   GENERAL: Denies fever, chills, or body aches  SKIN: no rashes, no skin wounds or ulcers.  GI: Denies abdominal pain. No N/V/D.   : See HPI.  NEURO: no headaches.    EXAM:   /84   Pulse 89   Temp 97.7 °F (36.5 °C)   Resp 16   Ht 5' 3\" (1.6 m)   Wt 247 lb (112 kg)   SpO2 100%   BMI 43.75 kg/m²   General appearance: alert, appears stated age and cooperative  Head: Normocephalic, without obvious abnormality, atraumatic  Eyes: conjunctivae/corneas clear. PERRL, EOM's intact. Fundi benign.  EARS: TM's pearly, pos bulging, no retraction,no effusion. No erythema or swelling noted to ear canal or external ear.     NOSE: nostrils patent, clear nasal mucus, nasal mucosa pink, no tenderness on palpation of maxillary sinuses  THROAT: oral mucosa pink, moist. Posterior pharynx not erythematous or injected. No exudates. No trismus. Uvula midline and without swelling. Voice normal/clear. No drooling, No stridor.  Neck: no adenopathy and supple, symmetrical, trachea midline  Lungs: clear to auscultation bilaterally  Heart: S1, S2 normal, no murmur, click, rub or gallop, regular rate and rhythm  Abdomen: soft,  non distended. No visible peristalsis. No masses. No organomegaly. No tenderness in any quadrant. Bowel sounds: present. Guarding : none. Rigidity: none.   Pulses: 2+ and symmetric  Skin: Skin color, texture, turgor normal. No rashes or lesions  Pt declined vagina exam    No results found for this or any previous visit (from the past 24 hours).      ASSESSMENT AND PLAN:   Yeast infection        Discussed HPI and physical exam with pt. We discussed concerns for BV, Candidiasis, GC/Chlamydia vs other etiologies. Pt has otherwise reassuring physical exam. Treatment options discussed with patient and explained in detail. Will empirically treat for vaginal yeast infection today with diflucan.  The risks, benefits and potential side effects of the medications were reviewed. Alternatives were discussed. Monitoring parameters and expected course outlined. Advised follow up with PCP or [facility] Department for further std testing that is not available here. We discussed signs and symptoms that should prompt her to go to the emergency department immediately for evaluation including any fevers, flank pain, abdominal pain, vomiting, vaginal bleeding or if she has any concerns. pt verbalized understanding and agreed with the plan.     There are no Patient Instructions on file for this visit.      The patient/parent indicates understanding of these issues and agrees to the plan.

## 2024-12-10 ENCOUNTER — TELEPHONE (OUTPATIENT)
Age: 39
End: 2024-12-10

## 2024-12-10 NOTE — TELEPHONE ENCOUNTER
Gisela Barba,    We received your online inquiry about connecting with health services. We're happy to help you get connected.     Please feel free to give our office a call at [phone] or to call our 24/7 line at [phone] for assistance.     Kathy BROWN LCSW (she/her/hers)  Patient Care Navigator - Mental Health  [facility]/Mental Health Division    [email or web address]  Request an assessment or support »

## 2024-12-13 ENCOUNTER — TELEPHONE (OUTPATIENT)
Age: 39
End: 2024-12-13

## 2024-12-13 NOTE — TELEPHONE ENCOUNTER
Hello - I am reaching out from the [facility] Navigation department, following up on an online request that you submitted, to assist in connecting you with resources for care. If you would like to discuss this further, please give us a call at [phone], or for more immediate assistance you can contact our 24-hour help line at [phone]. We look forward to hearing from you soon.

## 2025-01-06 ENCOUNTER — OFFICE VISIT (OUTPATIENT)
Dept: FAMILY MEDICINE CLINIC | Facility: CLINIC | Age: 40
End: 2025-01-06
Payer: MEDICAID

## 2025-01-06 VITALS
SYSTOLIC BLOOD PRESSURE: 122 MMHG | RESPIRATION RATE: 16 BRPM | TEMPERATURE: 99 F | DIASTOLIC BLOOD PRESSURE: 80 MMHG | WEIGHT: 250.38 LBS | HEART RATE: 93 BPM | OXYGEN SATURATION: 96 % | BODY MASS INDEX: 44 KG/M2

## 2025-01-06 DIAGNOSIS — H66.002 NON-RECURRENT ACUTE SUPPURATIVE OTITIS MEDIA OF LEFT EAR WITHOUT SPONTANEOUS RUPTURE OF TYMPANIC MEMBRANE: Primary | ICD-10-CM

## 2025-01-06 DIAGNOSIS — B37.9 ANTIBIOTIC-INDUCED YEAST INFECTION: ICD-10-CM

## 2025-01-06 DIAGNOSIS — T36.95XA ANTIBIOTIC-INDUCED YEAST INFECTION: ICD-10-CM

## 2025-01-06 PROCEDURE — 99213 OFFICE O/P EST LOW 20 MIN: CPT | Performed by: NURSE PRACTITIONER

## 2025-01-06 RX ORDER — AMOXICILLIN 875 MG/1
875 TABLET, COATED ORAL 2 TIMES DAILY
Qty: 20 TABLET | Refills: 0 | Status: SHIPPED | OUTPATIENT
Start: 2025-01-06 | End: 2025-01-16

## 2025-01-06 RX ORDER — FLUTICASONE PROPIONATE 50 MCG
2 SPRAY, SUSPENSION (ML) NASAL DAILY
Qty: 1 EACH | Refills: 0 | Status: SHIPPED | OUTPATIENT
Start: 2025-01-06 | End: 2025-02-05

## 2025-01-06 RX ORDER — FLUCONAZOLE 150 MG/1
150 TABLET ORAL ONCE
Qty: 1 TABLET | Refills: 0 | Status: SHIPPED | OUTPATIENT
Start: 2025-01-06 | End: 2025-01-06

## 2025-01-06 NOTE — PROGRESS NOTES
CHIEF COMPLAINT:     Chief Complaint   Patient presents with    Ear Problem     Right ear hurts deep inside. - Entered by patient  Right ear pain since today.  No meds/drops used.         HPI:   Freda Teran is a 39 year old female who presents to clinic today with complaints of Right ear pain. Has had for 1  days. Pain is described as aching, sharp at times and is located at inner right ear.  Patient denies history of ear infections.  chewing makes ear pain worse, but none is palliative.  Home treatment includes none.  Pt denies decreased hearing. Pt denies hearing loss. Pt denies drainage. Patient denies use of Q-tips to clean the ears. Pt reports nasal congestion. Pt reports recent allergy symptoms. Pt denies fever.    Current Outpatient Medications   Medication Sig Dispense Refill    fluconazole (DIFLUCAN) 150 MG Oral Tab Take 1 tablet (150 mg total) by mouth once for 1 dose. 1 tablet 0    amoxicillin 875 MG Oral Tab Take 1 tablet (875 mg total) by mouth 2 (two) times daily for 10 days. 20 tablet 0    fluticasone propionate 50 MCG/ACT Nasal Suspension 2 sprays by Nasal route daily. 1 each 0    Empagliflozin (JARDIANCE) 25 MG Oral Tab Take 1 tablet by mouth daily. 90 tablet 0    rosuvastatin 10 MG Oral Tab Take 1 tablet (10 mg total) by mouth nightly. 90 tablet 0    cyclobenzaprine 10 MG Oral Tab Take 1 tablet (10 mg total) by mouth 3 (three) times daily. 30 tablet 0    ubrogepant 100 MG Oral Tab Take 100 mg by mouth as needed.      Glucose Blood (ONETOUCH ULTRA) In Vitro Strip Use 1-2 times daily to check blood sugar as directed.  Dx E11.9      Blood Glucose Monitoring Suppl (ONETOUCH VERIO FLEX SYSTEM) w/Device Does not apply Kit 1 each by Other route as needed.      albuterol 108 (90 Base) MCG/ACT Inhalation Aero Soln Inhale 2 puffs into the lungs every 4 (four) hours as needed for Wheezing or Shortness of Breath.      hydrOXYzine 25 MG Oral Tab Take 1 tablet (25 mg total) by mouth 3 (three) times daily  as needed for Anxiety.        Past Medical History:    Anxiety    Arthritis    left hand due to a break 20 yrs ago    Depression    Diabetes (HCC)    Disorder of liver    FATTY LIVER    History of COVID-19    2024, NOT HOSPITALIZED    Hyperlipidemia    fatty liver also    Migraines    Obesity      Social History:  Social History     Socioeconomic History    Marital status: Single   Tobacco Use    Smoking status: Former     Current packs/day: 0.00     Average packs/day: 1 pack/day for 19.0 years (19.0 ttl pk-yrs)     Types: Cigarettes     Start date: 2000     Quit date: 2019     Years since quittin.6    Smokeless tobacco: Never   Vaping Use    Vaping status: Former    Substances: Nicotine    Devices: Disposable   Substance and Sexual Activity    Alcohol use: Yes     Comment: possibly one or two drinks per month    Drug use: Never   Other Topics Concern    Caffeine Concern Yes     Comment: drink energy drinks 4-5x per week    Exercise Yes     Comment: limited due to knee pain and abdominal pain    Seat Belt No    Special Diet No    Stress Concern Yes    Weight Concern Yes     Social Drivers of Health     Financial Resource Strain: Low Risk  (2022)    Received from [facility]    Overall Financial Resource Strain (CARDIA)     Difficulty of Paying Living Expenses: Not very hard   Food Insecurity: No Food Insecurity (2022)    Received from [facility]    Hunger Vital Sign     Worried About Running Out of Food in the Last Year: Never true     Ran Out of Food in the Last Year: Never true   Transportation Needs: No Transportation Needs (2022)    Received from [facility]    PRAPARE - Transportation     Lack of Transportation (Medical): No     Lack of Transportation (Non-Medical): No   Physical Activity: Inactive (2022)    Received from [facility]    Exercise Vital Sign     Days of Exercise  per Week: 0 days     Minutes of Exercise per Session: 0 min   Stress: Stress Concern Present (11/30/2022)    Received from [facility]    [place] [facility] - Occupational Stress Questionnaire     Feeling of Stress : Very much   Social Connections: Socially Isolated (11/30/2022)    Received from [facility]    Social Connection and Isolation Panel [NHANES]     Frequency of Communication with Friends and Family: Once a week     Frequency of Social Gatherings with Friends and Family: Once a week     Attends Mosque Services: Never     Active Member of Clubs or Organizations: No     Attends Club or Organization Meetings: Never     Marital Status:    Housing Stability: Low Risk  (11/30/2022)    Received [facility]    Housing Stability Vital Sign     Unable to Pay for Housing in the Last Year: No     Number of Places Lived in the Last Year: 1     Unstable Housing in the Last Year: No        REVIEW OF SYSTEMS:   GENERAL: Feeling well otherwise.  No chills.  No unexpected weight change.  SKIN: no unusual skin lesions or rashes  HEENT: See HPI  LUNGS: No cough, shortness of breath, or wheezing.  CARDIOVASCULAR: No chest pain, palpitations  GI: No N/V/C/D.  NEURO: denies headaches or dizziness    EXAM:   /80   Pulse 93   Temp 99.1 °F (37.3 °C) (Oral)   Resp 16   Wt 250 lb 6.4 oz (113.6 kg)   SpO2 96%   BMI 44.36 kg/m²   GENERAL: well developed, well nourished,in no apparent distress  SKIN: no rashes,no suspicious lesions  HEAD: atraumatic, normocephalic  EYES: conjunctiva clear, EOM intact  EARS: Tragus non tender on palpation bilaterally. External auditory canals healthy. Right TM: erythematous to borders, no bulging, no retraction,+yellowish  fluid approx 9 o'clock position, bony landmarks partially visualized.  Left TM: pearly, no bulging, no retraction,no fluid, bony landmarks visualized.  NOSE: nostrils  patent, No exudates, nasal mucosa pink and noninflamed  THROAT: oral mucosa pink, moist. Posterior pharynx is not erythematous or injected. No exudates.  NECK: supple, non-tender  LUNGS: clear to auscultation bilaterally, no wheezes or rhonchi. No crackles/rales, good air movement throughout. Breathing is non labored.  CARDIO: RRR without murmur  LYMPH: No cerivical lymphadenopathy.      ASSESSMENT AND PLAN:   Freda Teran is a 39 year old female who presents with:    ASSESSMENT:  Encounter Diagnoses   Name Primary?    Non-recurrent acute suppurative otitis media of left ear without spontaneous rupture of tympanic membrane Yes    Antibiotic-induced yeast infection        PLAN:Recommend starting flonase, watch and wait for abx as symptoms just started today.  Meds as listed below. comfort measures as described in Patient Instructions.      Meds & Refills for this Visit:  Requested Prescriptions     Signed Prescriptions Disp Refills    fluconazole (DIFLUCAN) 150 MG Oral Tab 1 tablet 0     Sig: Take 1 tablet (150 mg total) by mouth once for 1 dose.    amoxicillin 875 MG Oral Tab 20 tablet 0     Sig: Take 1 tablet (875 mg total) by mouth 2 (two) times daily for 10 days.    fluticasone propionate 50 MCG/ACT Nasal Suspension 1 each 0     Si sprays by Nasal route daily.         Risk and benefits of medication discussed. Stressed importance of completing full course of antibiotic.     Tylenol/Motrin prn pain.      Call or return if s/sx worsen, do not improve in 3 days, or if fever of 100.4 or greater persists for 72 hours.    There are no Patient Instructions on file for this visit.      Patient voiced understand and is in agreement with treatment plan.

## 2025-06-14 ENCOUNTER — HOSPITAL ENCOUNTER (EMERGENCY)
Age: 40
Discharge: HOME | End: 2025-06-14
Attending: EMERGENCY MEDICINE
Payer: COMMERCIAL

## 2025-06-14 ENCOUNTER — APPOINTMENT (OUTPATIENT)
Dept: GENERAL RADIOLOGY | Age: 40
End: 2025-06-14
Attending: EMERGENCY MEDICINE
Payer: COMMERCIAL

## 2025-06-14 VITALS
RESPIRATION RATE: 15 BRPM | HEIGHT: 63 IN | BODY MASS INDEX: 42.52 KG/M2 | TEMPERATURE: 99 F | OXYGEN SATURATION: 94 % | DIASTOLIC BLOOD PRESSURE: 83 MMHG | WEIGHT: 240 LBS | HEART RATE: 72 BPM | SYSTOLIC BLOOD PRESSURE: 111 MMHG

## 2025-06-14 DIAGNOSIS — R07.89 ATYPICAL CHEST PAIN: Primary | ICD-10-CM

## 2025-06-14 LAB
ALBUMIN SERPL-MCNC: 4.8 G/DL (ref 3.2–4.8)
ALBUMIN/GLOB SERPL: 1.6 {RATIO} (ref 1–2)
ALP LIVER SERPL-CCNC: 86 U/L (ref 37–98)
ALT SERPL-CCNC: 31 U/L (ref 10–49)
ANION GAP SERPL CALC-SCNC: 7 MMOL/L (ref 0–18)
AST SERPL-CCNC: 32 U/L (ref ?–34)
BASOPHILS # BLD AUTO: 0.06 X10(3) UL (ref 0–0.2)
BASOPHILS NFR BLD AUTO: 0.7 %
BILIRUB SERPL-MCNC: 0.6 MG/DL (ref 0.3–1.2)
BUN BLD-MCNC: 12 MG/DL (ref 9–23)
CALCIUM BLD-MCNC: 9.6 MG/DL (ref 8.7–10.6)
CHLORIDE SERPL-SCNC: 106 MMOL/L (ref 98–112)
CO2 SERPL-SCNC: 25 MMOL/L (ref 21–32)
CREAT BLD-MCNC: 0.8 MG/DL (ref 0.55–1.02)
EGFRCR SERPLBLD CKD-EPI 2021: 95 ML/MIN/1.73M2 (ref 60–?)
EOSINOPHIL # BLD AUTO: 0.59 X10(3) UL (ref 0–0.7)
EOSINOPHIL NFR BLD AUTO: 6.8 %
ERYTHROCYTE [DISTWIDTH] IN BLOOD BY AUTOMATED COUNT: 12.7 %
GLOBULIN PLAS-MCNC: 3 G/DL (ref 2–3.5)
GLUCOSE BLD-MCNC: 106 MG/DL (ref 70–99)
HCT VFR BLD AUTO: 46.3 % (ref 35–48)
HGB BLD-MCNC: 16.1 G/DL (ref 12–16)
IMM GRANULOCYTES # BLD AUTO: 0.03 X10(3) UL (ref 0–1)
IMM GRANULOCYTES NFR BLD: 0.3 %
INR BLD: 0.94 (ref 0.8–1.2)
LYMPHOCYTES # BLD AUTO: 2.97 X10(3) UL (ref 1–4)
LYMPHOCYTES NFR BLD AUTO: 34.4 %
MCH RBC QN AUTO: 28.6 PG (ref 26–34)
MCHC RBC AUTO-ENTMCNC: 34.8 G/DL (ref 31–37)
MCV RBC AUTO: 82.2 FL (ref 80–100)
MONOCYTES # BLD AUTO: 0.68 X10(3) UL (ref 0.1–1)
MONOCYTES NFR BLD AUTO: 7.9 %
NEUTROPHILS # BLD AUTO: 4.3 X10 (3) UL (ref 1.5–7.7)
NEUTROPHILS # BLD AUTO: 4.3 X10(3) UL (ref 1.5–7.7)
NEUTROPHILS NFR BLD AUTO: 49.9 %
NT-PROBNP SERPL-MCNC: <35 PG/ML (ref ?–125)
OSMOLALITY SERPL CALC.SUM OF ELEC: 286 MOSM/KG (ref 275–295)
PLATELET # BLD AUTO: 219 10(3)UL (ref 150–450)
POTASSIUM SERPL-SCNC: 4.2 MMOL/L (ref 3.5–5.1)
PROT SERPL-MCNC: 7.8 G/DL (ref 5.7–8.2)
PROTHROMBIN TIME: 12.3 SECONDS (ref 11.6–14.8)
RBC # BLD AUTO: 5.63 X10(6)UL (ref 3.8–5.3)
SODIUM SERPL-SCNC: 138 MMOL/L (ref 136–145)
TROPONIN I SERPL HS-MCNC: <3 NG/L (ref ?–34)
WBC # BLD AUTO: 8.6 X10(3) UL (ref 4–11)

## 2025-06-14 PROCEDURE — 84484 ASSAY OF TROPONIN QUANT: CPT | Performed by: EMERGENCY MEDICINE

## 2025-06-14 PROCEDURE — 80048 BASIC METABOLIC PNL TOTAL CA: CPT | Performed by: EMERGENCY MEDICINE

## 2025-06-14 PROCEDURE — 93010 ELECTROCARDIOGRAM REPORT: CPT

## 2025-06-14 PROCEDURE — 85025 COMPLETE CBC W/AUTO DIFF WBC: CPT | Performed by: EMERGENCY MEDICINE

## 2025-06-14 PROCEDURE — 71045 X-RAY EXAM CHEST 1 VIEW: CPT | Performed by: EMERGENCY MEDICINE

## 2025-06-14 PROCEDURE — 99285 EMERGENCY DEPT VISIT HI MDM: CPT

## 2025-06-14 PROCEDURE — 96374 THER/PROPH/DIAG INJ IV PUSH: CPT

## 2025-06-14 PROCEDURE — 93005 ELECTROCARDIOGRAM TRACING: CPT

## 2025-06-14 PROCEDURE — 83880 ASSAY OF NATRIURETIC PEPTIDE: CPT | Performed by: EMERGENCY MEDICINE

## 2025-06-14 PROCEDURE — 85610 PROTHROMBIN TIME: CPT | Performed by: EMERGENCY MEDICINE

## 2025-06-14 PROCEDURE — 80053 COMPREHEN METABOLIC PANEL: CPT | Performed by: EMERGENCY MEDICINE

## 2025-06-14 RX ORDER — DIPHENHYDRAMINE HYDROCHLORIDE 50 MG/ML
25 INJECTION, SOLUTION INTRAMUSCULAR; INTRAVENOUS ONCE
Status: DISCONTINUED | OUTPATIENT
Start: 2025-06-14 | End: 2025-06-14

## 2025-06-14 RX ORDER — METOCLOPRAMIDE HYDROCHLORIDE 5 MG/ML
10 INJECTION INTRAMUSCULAR; INTRAVENOUS ONCE
Status: DISCONTINUED | OUTPATIENT
Start: 2025-06-14 | End: 2025-06-14

## 2025-06-14 RX ORDER — KETOROLAC TROMETHAMINE 15 MG/ML
30 INJECTION, SOLUTION INTRAMUSCULAR; INTRAVENOUS ONCE
Status: COMPLETED | OUTPATIENT
Start: 2025-06-14 | End: 2025-06-14

## 2025-06-14 RX ORDER — NAPROXEN 500 MG/1
500 TABLET ORAL 2 TIMES DAILY PRN
Qty: 10 TABLET | Refills: 0 | Status: SHIPPED | OUTPATIENT
Start: 2025-06-14 | End: 2025-06-19

## 2025-06-14 NOTE — ED INITIAL ASSESSMENT (HPI)
Reports left sided chest pain that radiates to back since yesterday.  States pain has been constant.  States when she woke this am pain has returned.  Reports sob but denies n/v

## 2025-06-14 NOTE — ED PROVIDER NOTES
Patient Seen in: [facility] In [place]        History  Chief Complaint   Patient presents with    Chest Pain Angina     Stated Complaint: chest pain sob started yesterday    Subjective:   The history is provided by the patient.       39-year-old female with history of obesity, anxiety, depression presents to the ER with left-sided chest pain that radiates to her left neck and shoulder since yesterday.  Patient denies any exertional symptoms.  Symptoms are random.  Patient reports symptom is persistently there with intermittent worsening.  Denies any associated dyspnea, nausea, vomit, diaphoresis, dizziness      Objective:     Past Medical History:    Anxiety    Arthritis    left hand due to a break 20 yrs ago    Depression    Diabetes (HCC)    Disorder of liver    FATTY LIVER    History of COVID-19    2024, NOT HOSPITALIZED    Hyperlipidemia    fatty liver also    Migraines    Obesity              Past Surgical History:   Procedure Laterality Date      2009    Hysterectomy  2022    ovaries remain, everything else removed                Social History     Socioeconomic History    Marital status: Single   Tobacco Use    Smoking status: Former     Current packs/day: 0.00     Average packs/day: 1 pack/day for 19.0 years (19.0 ttl pk-yrs)     Types: Cigarettes     Start date: 2000     Quit date: 2019     Years since quittin.0    Smokeless tobacco: Never   Vaping Use    Vaping status: Former    Substances: Nicotine    Devices: Disposable   Substance and Sexual Activity    Alcohol use: Yes     Comment: possibly one or two drinks per month    Drug use: Never   Other Topics Concern    Caffeine Concern Yes     Comment: drink energy drinks 4-5x per week    Exercise Yes     Comment: limited due to knee pain and abdominal pain    Seat Belt No    Special Diet No    Stress Concern Yes    Weight Concern Yes     Social Drivers of Health     Food Insecurity: No Food  Insecurity (11/30/2022)    Received from [facility]    Hunger Vital Sign     Worried About Running Out of Food in the Last Year: Never true     Ran Out of Food in the Last Year: Never true   Transportation Needs: No Transportation Needs (11/30/2022)    Received from [facility]    PRAPARE - Transportation     Lack of Transportation (Medical): No     Lack of Transportation (Non-Medical): No   Housing Stability: Low Risk  (11/30/2022)    Received from [facility]    Housing Stability Vital Sign     Unable to Pay for Housing in the Last Year: No     Number of Places Lived in the Last Year: 1     Unstable Housing in the Last Year: No                                Physical Exam    ED Triage Vitals   BP 06/14/25 1059 111/83   Pulse 06/14/25 0954 91   Resp 06/14/25 0954 21   Temp 06/14/25 0954 98.7 °F (37.1 °C)   Temp src 06/14/25 0954 Temporal   SpO2 06/14/25 0954 94 %   O2 Device 06/14/25 1019 None (Room air)       Current Vitals:   Vital Signs  BP: 111/83  Pulse: 72  Resp: 15  Temp: 98.7 °F (37.1 °C)  Temp src: Temporal    Oxygen Therapy  SpO2: 94 %  O2 Device: None (Room air)            Physical Exam  Vitals and nursing note reviewed.   Constitutional:       General: She is not in acute distress.     Appearance: She is well-developed. She is not ill-appearing.   HENT:      Head: Normocephalic and atraumatic.   Eyes:      Extraocular Movements: Extraocular movements intact.      Pupils: Pupils are equal, round, and reactive to light.   Cardiovascular:      Rate and Rhythm: Normal rate and regular rhythm.      Pulses: Normal pulses.      Heart sounds: Normal heart sounds.   Pulmonary:      Effort: Pulmonary effort is normal. No respiratory distress.      Breath sounds: Normal breath sounds.   Chest:      Comments: Reproducible left-sided chest wall tenderness, left trapezius tenderness, left lateral neck tenderness  Abdominal:      General: Abdomen is flat. There is no distension.      Palpations:  Abdomen is soft.      Tenderness: There is no abdominal tenderness.   Musculoskeletal:      Cervical back: Neck supple.   Skin:     General: Skin is dry.   Neurological:      Mental Status: She is alert and oriented to person, place, and time.   Psychiatric:         Mood and Affect: Mood normal.         Behavior: Behavior normal.                 ED Course  Labs Reviewed   CBC WITH DIFFERENTIAL WITH PLATELET - Abnormal; Notable for the following components:       Result Value    RBC 5.63 (*)     HGB 16.1 (*)     All other components within normal limits   COMP METABOLIC PANEL (14) - Abnormal; Notable for the following components:    Glucose 106 (*)     All other components within normal limits   PROTHROMBIN TIME (PT) - Normal   PRO BETA NATRIURETIC PEPTIDE - Normal   TROPONIN I HIGH SENSITIVITY - Normal   RAINBOW DRAW LAVENDER   RAINBOW DRAW BLUE              ED Course as of 06/14/25 1508  ------------------------------------------------------------  Time: 06/14 1050  Comment: Independent interpretation of the CHEST X-RAY shows the trachea is midline, normal cardiac size and contour. No pleural effusions.  No pneumothorax.  No infiltrates or pulmonary edema.    ------------------------------------------------------------  Time: 06/14 1051  Comment: EKG INTERPRETATION  Time: 9:55 AM  Normal sinus rhythm, ventricular rate is 90  Normal axis  Normal IA, QRS, and QTc intervals  No chamber enlargement  Normal ST-T segments  I, the emergency physician, independently interpreted this EKG in the absence of a cardiologist.    ------------------------------------------------------------  Time: 06/14 1051  Comment: I independently interpreted labs, unremarkable.  ------------------------------------------------------------  Time: 06/14 1051  Comment: PE unlikely, PERC negative     XR CHEST AP PORTABLE  (CPT=71045)  Result Date: 6/14/2025  CONCLUSION:   Normal heart size.  Mild left basilar atelectasis.  No CHF, pleural  effusion, pneumothorax. LOCATION:  [facility]      Dictated by (CST): Serge Matute MD on 6/14/2025 at 10:21 AM     Finalized by (CST): Serge Matute MD on 6/14/2025 at 10:21 AM                         MDM     Differential diagnosis includes acute coronary syndrome, pneumothorax, pericarditis, myocarditis, GERD, esophagitis, pneumonia, pleuritis, costochondritis, pulmonary embolism          Medical Decision Making  #Musculoskeletal chest wall pain  Patient presented with nonexertional continuous chest pain that is reproducible on exam  Patient is PERC negative, PE not likely  Symptoms atypical for ACS, EKG has no ischemic changes and troponin is negative after chest pain since yesterday  Chest x-ray is clear, no pneumothorax no pneumonia  Reassurance provided  PMD follow-up, NSAIDs for pain    Amount and/or Complexity of Data Reviewed  Labs: ordered. Decision-making details documented in ED Course.  Radiology: ordered and independent interpretation performed. Decision-making details documented in ED Course.  ECG/medicine tests: ordered and independent interpretation performed. Decision-making details documented in ED Course.        Disposition and Plan     Clinical Impression:  1. Atypical chest pain         Disposition:  Discharge  6/14/2025 11:18 am    Follow-up:  Bennie Cantu MD  [id number]  [facility] [id number]  [phone]    Schedule an appointment as soon as possible for a visit  As needed          Medications Prescribed:  Discharge Medication List as of 6/14/2025 11:21 AM        START taking these medications    Details   naproxen 500 MG Oral Tab Take 1 tablet (500 mg total) by mouth 2 (two) times daily as needed (pain)., Normal, Disp-10 tablet, R-0                   Supplementary Documentation:

## 2025-06-15 LAB
ATRIAL RATE: 90 BPM
P AXIS: 10 DEGREES
P-R INTERVAL: 166 MS
Q-T INTERVAL: 386 MS
QRS DURATION: 86 MS
QTC CALCULATION (BEZET): 472 MS
R AXIS: 57 DEGREES
T AXIS: 31 DEGREES
VENTRICULAR RATE: 90 BPM

## 2025-07-12 ENCOUNTER — OFFICE VISIT (OUTPATIENT)
Dept: FAMILY MEDICINE CLINIC | Facility: CLINIC | Age: 40
End: 2025-07-12
Payer: COMMERCIAL

## 2025-07-12 DIAGNOSIS — Z02.9 ENCOUNTERS FOR ADMINISTRATIVE PURPOSE: Primary | ICD-10-CM

## 2025-07-12 NOTE — PROGRESS NOTES
The following individual(s) verbally consented to be recorded using ambient AI listening technology and understand that they can each withdraw their consent to this listening technology at any point by asking the clinician to turn off or pause the recording:    Patient name: Freda Crouch Jeffry

## (undated) DEVICE — ARM DRAPE

## (undated) DEVICE — PENCIL TELESCOPE MEGADYNE SE

## (undated) DEVICE — SUT COAT VCRL 3-0 27IN SH ABSRB UD 26MM 1/2

## (undated) DEVICE — 40580 - THE PINK PAD - ADVANCED TRENDELENBURG POSITIONING KIT: Brand: 40580 - THE PINK PAD - ADVANCED TRENDELENBURG POSITIONING KIT

## (undated) DEVICE — ADHESIVE SKIN TOP FOR WND CLSR DERMBND ADV

## (undated) DEVICE — GLOVE SUR 7.5 SENSICARE PI PIP GRN PWD F

## (undated) DEVICE — SUT MCRYL 4-0 18IN PS-2 ABSRB UD 19MM 3/8 CIR

## (undated) DEVICE — COVER LT HNDL RIG FOR SUR CAM DISP

## (undated) DEVICE — TRAY CATH 16FR F INCL BARDX IC COMPLT CARE

## (undated) DEVICE — CANNULA SEAL

## (undated) DEVICE — GLOVE SUR 7 SENSICARE PI PIP CRM PWD F

## (undated) DEVICE — LAPAROVUE VISIBILITY SYSTEM LAPAROSCOPIC SOLUTIONS: Brand: LAPAROVUE

## (undated) DEVICE — MEGA SUTURECUT ND: Brand: ENDOWRIST

## (undated) DEVICE — BLADELESS OBTURATOR: Brand: WECK VISTA

## (undated) DEVICE — STERILE DRAPE FOR USE WITH SITUATE ROOM SCANNER: Brand: SITUATE

## (undated) DEVICE — ENDOPATH ULTRA VERESS INSUFFLATION NEEDLES WITH LUER LOCK CONNECTORS: Brand: ENDOPATH

## (undated) DEVICE — COLUMN DRAPE

## (undated) DEVICE — PBT NON ABSORBABLE WOUND CLOSURE DEVICE: Brand: V-LOC

## (undated) DEVICE — FENESTRATED BIPOLAR FORCEPS: Brand: ENDOWRIST

## (undated) DEVICE — TIP COVER ACCESSORY

## (undated) DEVICE — ROBOTIC GENERAL: Brand: MEDLINE INDUSTRIES, INC.

## (undated) DEVICE — ABSORBABLE WOUND CLOSURE DEVICE: Brand: V-LOC 180

## (undated) DEVICE — SUT COAT VCRL 2-0 27IN SH ABSRB UD 26MM 1/2

## (undated) NOTE — LETTER
OUTSIDE TESTING RESULT REQUEST     IMPORTANT: FOR YOUR IMMEDIATE ATTENTION  Please FAX all test results listed below to: [phone]     Testing already done on or about: 1/3/24     * * * * If testing is NOT complete, arrange with patient A.S.A.P. * * * *    Patient Name: Freda Teran  Surgery Date: 2024  Medical Record: [id number]  CSN: [id number]  : 1985 - A: 38 y     Sex: female  Surgeon(s):  Jeremías Adame MD  Procedure: ROBOTIC LAPAROSCOPIC VENTRAL HERNIA REPAIR WITH MESH  Anesthesia Type: General     Surgeon: Jeremías Adame MD     The following Testing and Time Line are REQUIRED PER ANESTHESIA     EKG READ AND SIGNED WITHIN   90 days      Thank You,   Sent by: SATURNINO Celis

## (undated) NOTE — LETTER
CLARIFICATION FOR E-SSS    To: Dr. Jeremías Adame     Patient Name: Freda Teran  -Age / Sex: 1985-A: 38 y  female   Medical Records: [id number] [facility]: [id number]      Procedure Description:  ROBOTIC LAPAROSCOPIC VENTRAL HERNIA REPAIR WITH MESH    Please note that clarification is needed on the Electronic-Surgery Scheduling Sheet (E-SSS)  the original is included with this letter. Please have physician review and make changes on the faxed copy of the E-SSS.    Procedure per patient should be robotic laparoscopic umbilical hernia repair with mesh, possible open      Please review and submit change if needed      ALL CHANGES MUST BE DOCUMENTED ON THE E-SSS AND SIGNED BY THE PHYSICIAN    After physician has made the changes and signed the E-SSS please fax it to [phone] and these changes will then be made in Epic by the OR schedulers.     If you have any questions please call Pre-Admission Testing at [phone]    Thank you